# Patient Record
Sex: FEMALE | Race: WHITE | NOT HISPANIC OR LATINO | Employment: FULL TIME | URBAN - METROPOLITAN AREA
[De-identification: names, ages, dates, MRNs, and addresses within clinical notes are randomized per-mention and may not be internally consistent; named-entity substitution may affect disease eponyms.]

---

## 2017-01-17 ENCOUNTER — LAB VISIT (OUTPATIENT)
Dept: LAB | Facility: HOSPITAL | Age: 47
End: 2017-01-17
Attending: INTERNAL MEDICINE
Payer: COMMERCIAL

## 2017-01-17 DIAGNOSIS — E78.5 HYPERLIPIDEMIA LDL GOAL <100: ICD-10-CM

## 2017-01-17 LAB
ALBUMIN SERPL BCP-MCNC: 4 G/DL
ALP SERPL-CCNC: 102 U/L
ALT SERPL W/O P-5'-P-CCNC: 27 U/L
ANION GAP SERPL CALC-SCNC: 10 MMOL/L
AST SERPL-CCNC: 28 U/L
BILIRUB SERPL-MCNC: 0.4 MG/DL
BUN SERPL-MCNC: 11 MG/DL
CALCIUM SERPL-MCNC: 9.3 MG/DL
CHLORIDE SERPL-SCNC: 104 MMOL/L
CHOLEST/HDLC SERPL: 4.1 {RATIO}
CO2 SERPL-SCNC: 23 MMOL/L
CREAT SERPL-MCNC: 0.7 MG/DL
EST. GFR  (AFRICAN AMERICAN): >60 ML/MIN/1.73 M^2
EST. GFR  (NON AFRICAN AMERICAN): >60 ML/MIN/1.73 M^2
GLUCOSE SERPL-MCNC: 87 MG/DL
HDL/CHOLESTEROL RATIO: 24.4 %
HDLC SERPL-MCNC: 176 MG/DL
HDLC SERPL-MCNC: 43 MG/DL
LDLC SERPL CALC-MCNC: 102.2 MG/DL
NONHDLC SERPL-MCNC: 133 MG/DL
POTASSIUM SERPL-SCNC: 4.9 MMOL/L
PROT SERPL-MCNC: 7.7 G/DL
SODIUM SERPL-SCNC: 137 MMOL/L
TRIGL SERPL-MCNC: 154 MG/DL

## 2017-01-17 PROCEDURE — 80061 LIPID PANEL: CPT

## 2017-01-17 PROCEDURE — 80053 COMPREHEN METABOLIC PANEL: CPT

## 2017-01-17 PROCEDURE — 36415 COLL VENOUS BLD VENIPUNCTURE: CPT | Mod: PO

## 2017-01-24 ENCOUNTER — TELEPHONE (OUTPATIENT)
Dept: CARDIOLOGY | Facility: CLINIC | Age: 47
End: 2017-01-24

## 2017-01-24 NOTE — TELEPHONE ENCOUNTER
----- Message from Phi Quintero MD sent at 1/18/2017  7:54 AM CST -----  Please call pt, lab test showed stable cholesterol level, cont current meds.

## 2017-01-27 ENCOUNTER — OFFICE VISIT (OUTPATIENT)
Dept: CARDIOLOGY | Facility: CLINIC | Age: 47
End: 2017-01-27
Payer: COMMERCIAL

## 2017-01-27 VITALS
HEIGHT: 64 IN | WEIGHT: 151.25 LBS | BODY MASS INDEX: 25.82 KG/M2 | HEART RATE: 88 BPM | DIASTOLIC BLOOD PRESSURE: 86 MMHG | SYSTOLIC BLOOD PRESSURE: 126 MMHG

## 2017-01-27 DIAGNOSIS — E78.5 HYPERLIPIDEMIA LDL GOAL <100: Primary | ICD-10-CM

## 2017-01-27 DIAGNOSIS — Z82.49 FAMILY HISTORY OF ISCHEMIC HEART DISEASE: ICD-10-CM

## 2017-01-27 PROCEDURE — 1159F MED LIST DOCD IN RCRD: CPT | Mod: S$GLB,,, | Performed by: INTERNAL MEDICINE

## 2017-01-27 PROCEDURE — 99999 PR PBB SHADOW E&M-EST. PATIENT-LVL III: CPT | Mod: PBBFAC,,, | Performed by: INTERNAL MEDICINE

## 2017-01-27 PROCEDURE — 99213 OFFICE O/P EST LOW 20 MIN: CPT | Mod: S$GLB,,, | Performed by: INTERNAL MEDICINE

## 2017-01-27 NOTE — MR AVS SNAPSHOT
[facility] - Cardiology  [address] [facility] [address] [place] [address]  Phone: [phone]  Fax: [phone]                  Shane DOUGLAS Mike   2017 1:00 PM   Office Visit    Description:  Female : 1970   Provider:  Phi Quintero MD   Department:  [facility] - Cardiology           Reason for Visit     Hyperlipidemia           Diagnoses this Visit        Comments    Hyperlipidemia LDL goal <100    -  Primary     Family history of ischemic heart disease                To Do List           Goals (5 Years of Data)     None      Follow-Up and Disposition     Return if symptoms worsen or fail to improve.      [facility]     [facility] Nurse Care Line -  Assistance  Registered nurses in the [facility] On Call Center provide clinical advisement, health education, appointment booking, and other advisory services.  Call for this free service at [phone].             Medications                Verify that the below list of medications is an accurate representation of the medications you are currently taking.  If none reported, the list may be blank. If incorrect, please contact your healthcare provider. Carry this list with you in case of emergency.           Current Medications     aspirin (ECOTRIN) 81 MG EC tablet Take 1 tablet (81 mg total) by mouth once daily.    fluticasone (FLONASE) 50 mcg/actuation nasal spray 1 spray by Each Nare route Daily.    levocetirizine (XYZAL) 5 MG tablet Take 1 tablet (5 mg total) by mouth every evening.    levothyroxine (SYNTHROID) 25 MCG tablet Take 25 mcg by mouth once daily.    multivitamin (ONE DAILY MULTIVITAMIN) per tablet Take 1 tablet by mouth once daily.    pravastatin (PRAVACHOL) 20 MG tablet Take 1 tablet (20 mg total) by mouth every evening.    vitamin D 1000 units Tab Take 185 mg by mouth once daily.           Clinical Reference Information           Vital Signs - Last Recorded  Most recent update: 2017  1:12 PM by Joseph Rivers LPN    BP Pulse Ht Wt  "BMI    126/86 88 5' 4" (1.626 m) 68.6 kg (151 lb 3.8 oz) 25.96 kg/m2      Blood Pressure          Most Recent Value    Right Arm BP - Sitting  126/86    Left Arm BP - Sitting  124/96    BP  126/86      Allergies as of 1/27/2017     No Known Allergies      Immunizations Administered on Date of Encounter - 1/27/2017     None      [facility] Sign-Up     Activating your [facility] account is as easy as 1-2-3!     1) Visit [email or web address], select Sign Up Now, enter this activation code and your date of birth, then select Next.  [email or web address]  Expires: 3/13/2017  1:15 PM      2) Create a username and password to use when you visit [facility] in the future and select a security question in case you lose your password and select Next.    3) Enter your e-mail address and click Sign Up!    Additional Information  If you have questions, please e-mail [email or web address] or call [phone] to talk to our [facility] staff. Remember, [facility] is NOT to be used for urgent needs. For medical emergencies, dial 911.         Smoking Cessation     If you would like to quit smoking:   You may be eligible for free services if you are a [place] resident and started smoking cigarettes before September 1, 1988.  Call the [employer]oking [employer]essation [employer] (SCT) toll free at (836) 699-1766 or (392) 338-7798.   Call 9-800-QUIT-NOW if you do not meet the above criteria.            "

## 2017-01-27 NOTE — PROGRESS NOTES
Subjective:   Patient ID:  Shane Mckeon is a 46 y.o. female who presents for follow-up of follow up.     HPI  Saw  in 11/2014 for atypical chest pain. Had negative treadmill stress EKG test with good ET. 2D echo normal LVEF.    Her parents both had MI at age 55-60. Cardiac calcium score 0 in 06/2016. Started pravastatin for HLP control.   Came in today for 6 months follow up.   Patient feels OK, no chest pain, no sob, no dizziness, no syncope, no CNS symptoms.  Patient has fairly good exercise tolerance.  Patient is compliant with medications.      Past Medical History   Diagnosis Date    Anxiety     Chest pain syndrome 11/5/2014    Ovarian cyst        Past Surgical History   Procedure Laterality Date    Endometrial ablation         Social History   Substance Use Topics    Smoking status: Never Smoker    Smokeless tobacco: Not on file    Alcohol use Yes       Family History   Problem Relation Age of Onset    Heart failure Mother     Hypertension Mother     Hyperlipidemia Mother     Diabetes Mother     Heart attack Mother 55     MI    Heart failure Father     Hypertension Father     Hyperlipidemia Father     Heart attack Father 60     MI       Patient's Medications   New Prescriptions    No medications on file   Previous Medications    ASPIRIN (ECOTRIN) 81 MG EC TABLET    Take 1 tablet (81 mg total) by mouth once daily.    FLUTICASONE (FLONASE) 50 MCG/ACTUATION NASAL SPRAY    1 spray by Each Nare route Daily.    LEVOCETIRIZINE (XYZAL) 5 MG TABLET    Take 1 tablet (5 mg total) by mouth every evening.    LEVOTHYROXINE (SYNTHROID) 25 MCG TABLET    Take 25 mcg by mouth once daily.    MULTIVITAMIN (ONE DAILY MULTIVITAMIN) PER TABLET    Take 1 tablet by mouth once daily.    PRAVASTATIN (PRAVACHOL) 20 MG TABLET    Take 1 tablet (20 mg total) by mouth every evening.    VITAMIN D 1000 UNITS TAB    Take 185 mg by mouth once daily.   Modified Medications    No medications on file   Discontinued  Medications    No medications on file       Review of Systems   Constitution: Negative.   HENT: Negative.    Eyes: Negative.    Cardiovascular: Negative.  Negative for chest pain.   Respiratory: Negative.    Endocrine: Negative.    Hematologic/Lymphatic: Negative.    Skin: Negative.    Musculoskeletal: Positive for back pain.   Gastrointestinal: Negative.    Genitourinary: Negative.    Neurological: Negative.    Psychiatric/Behavioral: Negative.    Allergic/Immunologic: Negative.        Objective:   Physical Exam   Constitutional: She is oriented to person, place, and time. She appears well-developed and well-nourished.   HENT:   Head: Normocephalic and atraumatic.   Eyes: Conjunctivae are normal. Pupils are equal, round, and reactive to light.   Neck: Normal range of motion. Neck supple.   Cardiovascular: Normal rate and regular rhythm.    Pulmonary/Chest: Effort normal and breath sounds normal.   Abdominal: Soft. Bowel sounds are normal.   Musculoskeletal: Normal range of motion.   Neurological: She is alert and oriented to person, place, and time.   Skin: Skin is warm and dry.   Psychiatric: She has a normal mood and affect.       Lab Results   Component Value Date     01/17/2017    K 4.9 01/17/2017     01/17/2017    CO2 23 01/17/2017    BUN 11 01/17/2017    CREATININE 0.7 01/17/2017    GLU 87 01/17/2017    AST 28 01/17/2017    ALT 27 01/17/2017    ALBUMIN 4.0 01/17/2017    PROT 7.7 01/17/2017    BILITOT 0.4 01/17/2017    WBC 5.69 02/10/2014    HGB 13.0 02/10/2014    HCT 39.6 02/10/2014    MCV 98 02/10/2014     02/10/2014    TSH 1.646 02/10/2014    CHOL 176 01/17/2017    HDL 43 01/17/2017    LDLCALC 102.2 01/17/2017    TRIG 154 (H) 01/17/2017     Assessment:        ICD-10-CM ICD-9-CM   1. Hyperlipidemia LDL goal <100 E78.5 272.4   2. Family history of ischemic heart disease Z82.49 V17.3       Plan:     - good chol control with pravastatin.   - cont current meds. Annual lab, follow up PCP.   RTC  prn.

## 2017-03-03 ENCOUNTER — OFFICE VISIT (OUTPATIENT)
Dept: URGENT CARE | Facility: CLINIC | Age: 47
End: 2017-03-03
Payer: COMMERCIAL

## 2017-03-03 VITALS
OXYGEN SATURATION: 100 % | TEMPERATURE: 98 F | HEART RATE: 99 BPM | DIASTOLIC BLOOD PRESSURE: 80 MMHG | SYSTOLIC BLOOD PRESSURE: 120 MMHG | HEIGHT: 65 IN | WEIGHT: 154.75 LBS | BODY MASS INDEX: 25.78 KG/M2

## 2017-03-03 DIAGNOSIS — J01.00 ACUTE NON-RECURRENT MAXILLARY SINUSITIS: Primary | ICD-10-CM

## 2017-03-03 PROCEDURE — 99214 OFFICE O/P EST MOD 30 MIN: CPT | Mod: S$GLB,,, | Performed by: PHYSICIAN ASSISTANT

## 2017-03-03 PROCEDURE — 1160F RVW MEDS BY RX/DR IN RCRD: CPT | Mod: S$GLB,,, | Performed by: PHYSICIAN ASSISTANT

## 2017-03-03 PROCEDURE — 99999 PR PBB SHADOW E&M-EST. PATIENT-LVL III: CPT | Mod: PBBFAC,,, | Performed by: PHYSICIAN ASSISTANT

## 2017-03-03 RX ORDER — BENZONATATE 100 MG/1
200 CAPSULE ORAL 3 TIMES DAILY PRN
Qty: 60 CAPSULE | Refills: 0 | Status: SHIPPED | OUTPATIENT
Start: 2017-03-03 | End: 2017-03-13

## 2017-03-03 RX ORDER — AMOXICILLIN AND CLAVULANATE POTASSIUM 875; 125 MG/1; MG/1
1 TABLET, FILM COATED ORAL 2 TIMES DAILY
Qty: 14 TABLET | Refills: 0 | Status: SHIPPED | OUTPATIENT
Start: 2017-03-03 | End: 2017-03-10

## 2017-03-03 NOTE — MR AVS SNAPSHOT
[facility]  [address] [address]  Phone: [phone]  Fax: [phone]                  Shane Mckeon   3/3/2017 8:50 AM   Office Visit    Description:  Female : 1970   Provider:  Hemalatha Montemayor PA-C   Department:  [facility]           Reason for Visit     Sinus Problem           Diagnoses this Visit        Comments    Acute non-recurrent maxillary sinusitis    -  Primary            To Do List           Goals (5 Years of Data)     None      Follow-Up and Disposition     Return if symptoms worsen or fail to improve.       These Medications        Disp Refills Start End    amoxicillin-clavulanate 875-125mg (AUGMENTIN) 875-125 mg per tablet 14 tablet 0 3/3/2017 3/10/2017    Take 1 tablet by mouth 2 (two) times daily. - Oral    [facility] [address] Ph #: [phone]       benzonatate (TESSALON) 100 MG capsule 60 capsule 0 3/3/2017 3/13/2017    Take 2 capsules (200 mg total) by mouth 3 (three) times daily as needed for Cough. - Oral    Pharmacy: [facility] [address] Ph #: [phone]         [facility] On [facility]     [facility] On Call Nurse Care Line - 24/7 Assistance  Registered nurses in the [facility] provide clinical advisement, health education, appointment booking, and other advisory services.  Call for this free service at [phone].             Medications           START taking these NEW medications        Refills    amoxicillin-clavulanate 875-125mg (AUGMENTIN) 875-125 mg per tablet 0    Sig: Take 1 tablet by mouth 2 (two) times daily.    Class: Normal    Route: Oral    benzonatate (TESSALON) 100 MG capsule 0    Sig: Take 2 capsules (200 mg total) by mouth 3 (three) times daily as needed for Cough.    Class: Normal    Route: Oral           Verify that the below list of  "medications is an accurate representation of the medications you are currently taking.  If none reported, the list may be blank. If incorrect, please contact your healthcare provider. Carry this list with you in case of emergency.           Current Medications     amoxicillin-clavulanate 875-125mg (AUGMENTIN) 875-125 mg per tablet Take 1 tablet by mouth 2 (two) times daily.    aspirin (ECOTRIN) 81 MG EC tablet Take 1 tablet (81 mg total) by mouth once daily.    benzonatate (TESSALON) 100 MG capsule Take 2 capsules (200 mg total) by mouth 3 (three) times daily as needed for Cough.    fluticasone (FLONASE) 50 mcg/actuation nasal spray 1 spray by Each Nare route Daily.    levocetirizine (XYZAL) 5 MG tablet Take 1 tablet (5 mg total) by mouth every evening.    levothyroxine (SYNTHROID) 25 MCG tablet Take 25 mcg by mouth once daily.    multivitamin (ONE DAILY MULTIVITAMIN) per tablet Take 1 tablet by mouth once daily.    pravastatin (PRAVACHOL) 20 MG tablet Take 1 tablet (20 mg total) by mouth every evening.    vitamin D 1000 units Tab Take 185 mg by mouth once daily.           Clinical Reference Information           Your Vitals Were     BP Pulse Temp Height    120/80 (BP Location: Left arm, Patient Position: Sitting, BP Method: Manual) 99 97.8 °F (36.6 °C) (Tympanic) 5' 5" (1.651 m)    Weight SpO2 BMI    70.2 kg (154 lb 12.2 oz) 100% 25.75 kg/m2      Blood Pressure          Most Recent Value    BP  120/80      Allergies as of 3/3/2017     No Known Allergies      Immunizations Administered on Date of Encounter - 3/3/2017     None      [facility] Sign-Up     Activating your [facility] account is as easy as 1-2-3!     1) Visit [email or web address], select Sign Up Now, enter this activation code and your date of birth, then select Next.  [email or web address]  Expires: 3/13/2017  1:15 PM      2) Create a username and password to use when you visit [facility] in the future and select a security question in case you lose your password " and select Next.    3) Enter your e-mail address and click Sign Up!    Additional Information  If you have questions, please e-mail [email or web address] or call [phone] to talk to our [facility] staff. Remember, [facility] is NOT to be used for urgent needs. For medical emergencies, dial 911.         Instructions      Sinusitis (Antibiotic Treatment)    The sinuses are air-filled spaces within the bones of the face. They connect to the inside of the nose. Sinusitis is an inflammation of the tissue lining the sinus cavity. Sinus inflammation can occur during a cold. It can also be due to allergies to pollens and other particles in the air. Sinusitis can cause symptoms of sinus congestion and fullness. A sinus infection causes fever, headache and facial pain. There is often green or yellow drainage from the nose or into the back of the throat (post-nasal drip). You have been given antibiotics to treat this condition.  Home care:  · Take the full course of antibiotics as instructed. Do not stop taking them, even if you feel better.  · Drink plenty of water, hot tea, and other liquids. This may help thin mucus. It also may promote sinus drainage.  · Heat may help soothe painful areas of the face. Use a towel soaked in hot water. Or,  the shower and direct the hot spray onto your face. Using a vaporizer along with a menthol rub at night may also help.   · An expectorant containing guaifenesin may help thin the mucus and promote drainage from the sinuses.  · Over-the-counter decongestants may be used unless a similar medicine was prescribed. Nasal sprays work the fastest. Use one that contains phenylephrine or oxymetazoline. First blow the nose gently. Then use the spray. Do not use these medicines more often than directed on the label or symptoms may get worse. You may also use tablets containing pseudoephedrine. Avoid products that combine ingredients, because side effects may be increased. Read labels. You  can also ask the pharmacist for help. (NOTE: Persons with high blood pressure should not use decongestants. They can raise blood pressure.)  · Over-the-counter antihistamines may help if allergies contributed to your sinusitis.    · Do not use nasal rinses or irrigation during an acute sinus infection, unless told to by your health care provider. Rinsing may spread the infection to other sinuses.  · Use acetaminophen or ibuprofen to control pain, unless another pain medicine was prescribed. (If you have chronic liver or kidney disease or ever had a stomach ulcer, talk with your doctor before using these medicines. Aspirin should never be used in anyone under 18 years of age who is ill with a fever. It may cause severe liver damage.)  · Don't smoke. This can worsen symptoms.  Follow-up care  Follow up with your healthcare provider or our staff if you are not improving within the next week.  When to seek medical advice  Call your healthcare provider if any of these occur:  · Facial pain or headache becoming more severe  · Stiff neck  · Unusual drowsiness or confusion  · Swelling of the forehead or eyelids  · Vision problems, including blurred or double vision  · Fever of 100.4ºF (38ºC) or higher, or as directed by your healthcare provider  · Seizure  · Breathing problems  · Symptoms not resolving within 10 days  Date Last Reviewed: 4/13/2015  © 4649-2255 [employer]. [address] [place] [address], [place] [address]. All rights reserved. This information is not intended as a substitute for professional medical care. Always follow your healthcare professional's instructions.    Mucinex DM for sinus drainage  Take all antibiotics even if you feel better before completing the entire regimen.   -Use normal saline nasal spray during the day every 3 hours for sinus irrigation and congestion.    -Avoid exposure to cigarette smoke.    -Practice good handwashing.  -Use warm compresses to sinuses for relief several times  a day  -Increase fluid intake to at least 64 ounces of water per day to keep secretions thin and loose  -Use a humidifier in home to help relieve congestion or steam inhalation three times a day for 20-30 minutes.  -Sleep with head of bed elevated   -Take tylenol or ibuprofen as needed for sinus headache.    -Use warm salt water gargles for throat discomfort.  -Avoid caffeine and alcohol    Follow up with PCP in 1 week if no improvement or sooner if worsening.    Go to ER if you develop fever of 103 or higher, chest pain, shortness of breath, upper back pain, stiff neck or severe headache.           Language Assistance Services     ATTENTION: Language assistance services are available, free of charge. Please call [phone].      ATENCIÓN: Si juan m fleming, tiene a campbell disposición servicios gratuitos de asistencia lingüística. Llame al [phone].     KATARINA Ý: N?u b?n nói Ti?ng Vi?t, có các d?ch v? h? tr? ngôn ng? mi?n phí dành cho b?n. G?i s? [phone].         [facility] complies with applicable Federal civil rights laws and does not discriminate on the basis of race, color, national origin, age, disability, or sex.

## 2017-03-03 NOTE — PROGRESS NOTES
"Subjective:       Patient ID: Shane Mckeon is a 47 y.o. female.    Chief Complaint: Sinus Problem    Sinus Problem   This is a new problem. The current episode started 1 to 4 weeks ago (has had upper respiratory symptoms for the past 3 weeks, worsening sinus pain and congestion for last 3 days). There has been no fever. The pain is moderate. Associated symptoms include congestion, coughing, headaches and sinus pressure. Pertinent negatives include no chills, ear pain, shortness of breath, sneezing, sore throat or swollen glands. Treatments tried: daquil, nyquil, flonase. The treatment provided mild relief.     Review of Systems   Constitutional: Negative for chills, fatigue, fever and unexpected weight change.   HENT: Positive for congestion, rhinorrhea and sinus pressure. Negative for ear discharge, ear pain, postnasal drip, sneezing and sore throat.    Respiratory: Positive for cough. Negative for shortness of breath and wheezing.    Cardiovascular: Negative for chest pain and leg swelling.   Gastrointestinal: Negative for abdominal pain, nausea and vomiting.   Neurological: Positive for headaches.       Objective:      /80 (BP Location: Left arm, Patient Position: Sitting, BP Method: Manual)  Pulse 99  Temp 97.8 °F (36.6 °C) (Tympanic)   Ht 5' 5" (1.651 m)  Wt 70.2 kg (154 lb 12.2 oz)  SpO2 100%  BMI 25.75 kg/m2  Physical Exam   Constitutional: She is oriented to person, place, and time. She appears well-developed and well-nourished. No distress.   HENT:   Head: Normocephalic and atraumatic.   Right Ear: Tympanic membrane, external ear and ear canal normal.   Left Ear: Tympanic membrane, external ear and ear canal normal.   Nose: Right sinus exhibits maxillary sinus tenderness. Right sinus exhibits no frontal sinus tenderness. Left sinus exhibits maxillary sinus tenderness. Left sinus exhibits no frontal sinus tenderness.   Mouth/Throat: Oropharynx is clear and moist. No oropharyngeal exudate or " posterior oropharyngeal erythema.   Eyes: Conjunctivae and EOM are normal. Pupils are equal, round, and reactive to light. Right eye exhibits no discharge. Left eye exhibits no discharge. No scleral icterus.   Neck: Normal range of motion. Neck supple.   Cardiovascular: Normal rate, regular rhythm, normal heart sounds and intact distal pulses.  Exam reveals no gallop and no friction rub.    No murmur heard.  Pulmonary/Chest: Effort normal and breath sounds normal. No stridor. No respiratory distress. She has no wheezes. She has no rales. She exhibits no tenderness.   Lymphadenopathy:     She has no cervical adenopathy.   Neurological: She is alert and oriented to person, place, and time. Coordination normal.   Skin: Skin is warm and dry. No rash noted. She is not diaphoretic. No erythema. No pallor.   Nursing note and vitals reviewed.      Assessment:       1. Acute non-recurrent maxillary sinusitis        Plan:       Acute non-recurrent maxillary sinusitis  -     amoxicillin-clavulanate 875-125mg (AUGMENTIN) 875-125 mg per tablet; Take 1 tablet by mouth 2 (two) times daily.  Dispense: 14 tablet; Refill: 0  -     benzonatate (TESSALON) 100 MG capsule; Take 2 capsules (200 mg total) by mouth 3 (three) times daily as needed for Cough.  Dispense: 60 capsule; Refill: 0      Mucinex DM for sinus drainage  Take all antibiotics even if you feel better before completing the entire regimen.   -Use normal saline nasal spray during the day every 3 hours for sinus irrigation and congestion.    -Avoid exposure to cigarette smoke.    -Practice good handwashing.  -Use warm compresses to sinuses for relief several times a day  -Increase fluid intake to at least 64 ounces of water per day to keep secretions thin and loose  -Use a humidifier in home to help relieve congestion or steam inhalation three times a day for 20-30 minutes.  -Sleep with head of bed elevated   -Take tylenol or ibuprofen as needed for sinus headache.    -Use  warm salt water gargles for throat discomfort.  -Avoid caffeine and alcohol    Follow up with PCP in 1 week if no improvement or sooner if worsening.    Go to ER if you develop fever of 103 or higher, chest pain, shortness of breath, upper back pain, stiff neck or severe headache.        Heather Trant PA-C [facility]

## 2017-03-03 NOTE — PATIENT INSTRUCTIONS
Sinusitis (Antibiotic Treatment)    The sinuses are air-filled spaces within the bones of the face. They connect to the inside of the nose. Sinusitis is an inflammation of the tissue lining the sinus cavity. Sinus inflammation can occur during a cold. It can also be due to allergies to pollens and other particles in the air. Sinusitis can cause symptoms of sinus congestion and fullness. A sinus infection causes fever, headache and facial pain. There is often green or yellow drainage from the nose or into the back of the throat (post-nasal drip). You have been given antibiotics to treat this condition.  Home care:  · Take the full course of antibiotics as instructed. Do not stop taking them, even if you feel better.  · Drink plenty of water, hot tea, and other liquids. This may help thin mucus. It also may promote sinus drainage.  · Heat may help soothe painful areas of the face. Use a towel soaked in hot water. Or,  the shower and direct the hot spray onto your face. Using a vaporizer along with a menthol rub at night may also help.   · An expectorant containing guaifenesin may help thin the mucus and promote drainage from the sinuses.  · Over-the-counter decongestants may be used unless a similar medicine was prescribed. Nasal sprays work the fastest. Use one that contains phenylephrine or oxymetazoline. First blow the nose gently. Then use the spray. Do not use these medicines more often than directed on the label or symptoms may get worse. You may also use tablets containing pseudoephedrine. Avoid products that combine ingredients, because side effects may be increased. Read labels. You can also ask the pharmacist for help. (NOTE: Persons with high blood pressure should not use decongestants. They can raise blood pressure.)  · Over-the-counter antihistamines may help if allergies contributed to your sinusitis.    · Do not use nasal rinses or irrigation during an acute sinus infection, unless told to by  your health care provider. Rinsing may spread the infection to other sinuses.  · Use acetaminophen or ibuprofen to control pain, unless another pain medicine was prescribed. (If you have chronic liver or kidney disease or ever had a stomach ulcer, talk with your doctor before using these medicines. Aspirin should never be used in anyone under 18 years of age who is ill with a fever. It may cause severe liver damage.)  · Don't smoke. This can worsen symptoms.  Follow-up care  Follow up with your healthcare provider or our staff if you are not improving within the next week.  When to seek medical advice  Call your healthcare provider if any of these occur:  · Facial pain or headache becoming more severe  · Stiff neck  · Unusual drowsiness or confusion  · Swelling of the forehead or eyelids  · Vision problems, including blurred or double vision  · Fever of 100.4ºF (38ºC) or higher, or as directed by your healthcare provider  · Seizure  · Breathing problems  · Symptoms not resolving within 10 days  Date Last Reviewed: 4/13/2015  © 9621-8800 [employer]. [address] [place] [address]. All rights reserved. This information is not intended as a substitute for professional medical care. Always follow your healthcare professional's instructions.    Mucinex DM for sinus drainage  Take all antibiotics even if you feel better before completing the entire regimen.   -Use normal saline nasal spray during the day every 3 hours for sinus irrigation and congestion.    -Avoid exposure to cigarette smoke.    -Practice good handwashing.  -Use warm compresses to sinuses for relief several times a day  -Increase fluid intake to at least 64 ounces of water per day to keep secretions thin and loose  -Use a humidifier in home to help relieve congestion or steam inhalation three times a day for 20-30 minutes.  -Sleep with head of bed elevated   -Take tylenol or ibuprofen as needed for sinus headache.    -Use warm  salt water gargles for throat discomfort.  -Avoid caffeine and alcohol    Follow up with PCP in 1 week if no improvement or sooner if worsening.    Go to ER if you develop fever of 103 or higher, chest pain, shortness of breath, upper back pain, stiff neck or severe headache.

## 2017-03-08 ENCOUNTER — OFFICE VISIT (OUTPATIENT)
Dept: INTERNAL MEDICINE | Facility: CLINIC | Age: 47
End: 2017-03-08
Payer: COMMERCIAL

## 2017-03-08 ENCOUNTER — HOSPITAL ENCOUNTER (OUTPATIENT)
Dept: RADIOLOGY | Facility: HOSPITAL | Age: 47
Discharge: HOME | End: 2017-03-08
Attending: FAMILY MEDICINE
Payer: COMMERCIAL

## 2017-03-08 VITALS
SYSTOLIC BLOOD PRESSURE: 120 MMHG | HEIGHT: 64 IN | DIASTOLIC BLOOD PRESSURE: 80 MMHG | TEMPERATURE: 98 F | BODY MASS INDEX: 26.64 KG/M2 | HEART RATE: 84 BPM | WEIGHT: 156.06 LBS

## 2017-03-08 DIAGNOSIS — M23.91 INTERNAL DERANGEMENT OF KNEE, RIGHT: ICD-10-CM

## 2017-03-08 DIAGNOSIS — M25.561 POSTERIOR RIGHT KNEE PAIN: Primary | ICD-10-CM

## 2017-03-08 DIAGNOSIS — M25.561 POSTERIOR RIGHT KNEE PAIN: ICD-10-CM

## 2017-03-08 PROCEDURE — 1160F RVW MEDS BY RX/DR IN RCRD: CPT | Mod: S$GLB,,, | Performed by: FAMILY MEDICINE

## 2017-03-08 PROCEDURE — 73560 X-RAY EXAM OF KNEE 1 OR 2: CPT | Mod: TC,59,PO,LT

## 2017-03-08 PROCEDURE — 73562 X-RAY EXAM OF KNEE 3: CPT | Mod: 26,RT,, | Performed by: RADIOLOGY

## 2017-03-08 PROCEDURE — 99999 PR PBB SHADOW E&M-EST. PATIENT-LVL III: CPT | Mod: PBBFAC,,, | Performed by: FAMILY MEDICINE

## 2017-03-08 PROCEDURE — 73560 X-RAY EXAM OF KNEE 1 OR 2: CPT | Mod: 26,59,LT, | Performed by: RADIOLOGY

## 2017-03-08 PROCEDURE — 99214 OFFICE O/P EST MOD 30 MIN: CPT | Mod: S$GLB,,, | Performed by: FAMILY MEDICINE

## 2017-03-08 RX ORDER — NAPROXEN 500 MG/1
500 TABLET ORAL 2 TIMES DAILY WITH MEALS
Qty: 20 TABLET | Refills: 0 | Status: SHIPPED | OUTPATIENT
Start: 2017-03-08 | End: 2017-03-24 | Stop reason: ALTCHOICE

## 2017-03-08 NOTE — PROGRESS NOTES
There is some minimal joint space narrowing involving the medial compartment of both knees. No joint effusion.  No acute fracture or dislocation. Proceed with PT as discussed. Let me know if not improving with use of brace, nsaids and PT after 2 weeks.

## 2017-03-08 NOTE — PROGRESS NOTES
Subjective:      Patient ID: Shane Mckeon is a 47 y.o. female.    Chief Complaint: Knee Pain (r knee comes and goes especially going down steps)    HPI Comments: Pt is here for right knee pain for a few weeks. Worse with going down steps.  She reports that when she steps to turn she feels like it's going to give way.  She does have some pain on the outer backside of her knee.  Going down stairs seems to be worse.  She tried some anti-inflammatories but no change.  She does work as a catering on the weekends.  She is not currently wearing a brace.  There's been no direct trauma.    Knee Pain    The incident occurred 3 to 5 days ago. There was no injury mechanism. The pain is present in the right knee. The quality of the pain is described as aching. The pain is at a severity of 0/10. The patient is experiencing no pain. The pain has been intermittent since onset. Associated symptoms include a loss of motion. Pertinent negatives include no inability to bear weight, loss of sensation, muscle weakness, numbness or tingling. She reports no foreign bodies present. The symptoms are aggravated by movement. She has tried NSAIDs for the symptoms. The treatment provided no relief.       Lab Results   Component Value Date    WBC 5.69 02/10/2014    HGB 13.0 02/10/2014    HCT 39.6 02/10/2014     02/10/2014    CHOL 176 01/17/2017    TRIG 154 (H) 01/17/2017    HDL 43 01/17/2017    ALT 27 01/17/2017    AST 28 01/17/2017     01/17/2017    K 4.9 01/17/2017     01/17/2017    CREATININE 0.7 01/17/2017    BUN 11 01/17/2017    CO2 23 01/17/2017    TSH 1.646 02/10/2014       Review of Systems   Constitutional: Positive for activity change. Negative for appetite change, fatigue and unexpected weight change.   Respiratory: Negative for cough and shortness of breath.    Cardiovascular: Positive for leg swelling. Negative for chest pain and palpitations.   Musculoskeletal: Positive for arthralgias, gait problem and joint  swelling. Negative for myalgias.   Neurological: Positive for weakness. Negative for tingling and numbness.     Objective:     Physical Exam   Constitutional: She appears well-developed and well-nourished.   Cardiovascular: Normal rate and regular rhythm.    Pulmonary/Chest: Effort normal and breath sounds normal.   Musculoskeletal:        Right knee: She exhibits abnormal meniscus. She exhibits normal range of motion, no swelling, no effusion, no deformity and normal patellar mobility. Tenderness found. Lateral joint line tenderness noted. No MCL, no LCL and no patellar tendon tenderness noted.   Vitals reviewed.    Assessment:     1. Posterior right knee pain    2. Internal derangement of knee, right      Plan:   Shane was seen today for knee pain.    Diagnoses and all orders for this visit:    Posterior right knee pain-new, with lateral pain.  Instability with going down steps.  Comments:  for 2-3 weeks, suspect internal derangement with mensicus injury. xray today, nsaids, brace PT. if not improving will set up MRI  Orders:  -     Cancel: X-Ray Knee 3 View Right; Future  -     Ambulatory Referral to Physical/Occupational Therapy  -     naproxen (NAPROSYN) 500 MG tablet; Take 1 tablet (500 mg total) by mouth 2 (two) times daily with meals.    Internal derangement of knee, right-new, suspected.  Advised patient to use anti-inflammatories, given a brace, also will refer to physical therapy.  If not improving in 2-3 weeks we'll set up MRI.  Patient was in agreement with the plan.  -     Cancel: X-Ray Knee 3 View Right; Future  -     Ambulatory Referral to Physical/Occupational Therapy  -     naproxen (NAPROSYN) 500 MG tablet; Take 1 tablet (500 mg total) by mouth 2 (two) times daily with meals.            Return if symptoms worsen or fail to improve.

## 2017-03-08 NOTE — MR AVS SNAPSHOT
[facility]  [address]  [place] [address]  Phone: [phone]  Fax: [phone]                  Shane Mckeon   3/8/2017 1:40 PM   Office Visit    Description:  Female : 1970   Provider:  Nika Zavala MD   [facility]           Reason for Visit     Knee Pain           Diagnoses this Visit        Comments    Posterior right knee pain    -  Primary for 2-3 weeks, suspect internal derangement with mensicus injury. xray today, nsaids, brace PT. if not improving will set up MRI    Internal derangement of knee, right                To Do List           Goals (5 Years of Data)     None      Follow-Up and Disposition     Return if symptoms worsen or fail to improve.       These Medications        Disp Refills Start End    naproxen (NAPROSYN) 500 MG tablet 20 tablet 0 3/8/2017     Take 1 tablet (500 mg total) by mouth 2 (two) times daily with meals. - Oral    Ph[facility], [place]  [address] [address]  #: [phone]         [facility] On Call     [facility] On Call Nurse Care Line -  Assistance  Registered nurses in the [facility] On Call Center provide clinical advisement, health education, appointment booking, and other advisory services.  Call for this free service at [phone].             Medications           START taking these NEW medications        Refills    naproxen (NAPROSYN) 500 MG tablet 0    Sig: Take 1 tablet (500 mg total) by mouth 2 (two) times daily with meals.    Class: Normal    Route: Oral           Verify that the below list of medications is an accurate representation of the medications you are currently taking.  If none reported, the list may be blank. If incorrect, please contact your healthcare provider. Carry this list with you in case of emergency.           Current Medications     amoxicillin-clavulanate 875-125mg (AUGMENTIN) 875-125 mg per  "tablet Take 1 tablet by mouth 2 (two) times daily.    aspirin (ECOTRIN) 81 MG EC tablet Take 1 tablet (81 mg total) by mouth once daily.    benzonatate (TESSALON) 100 MG capsule Take 2 capsules (200 mg total) by mouth 3 (three) times daily as needed for Cough.    fluticasone (FLONASE) 50 mcg/actuation nasal spray 1 spray by Each Nare route Daily.    levocetirizine (XYZAL) 5 MG tablet Take 1 tablet (5 mg total) by mouth every evening.    levothyroxine (SYNTHROID) 25 MCG tablet Take 25 mcg by mouth once daily.    multivitamin (ONE DAILY MULTIVITAMIN) per tablet Take 1 tablet by mouth once daily.    naproxen (NAPROSYN) 500 MG tablet Take 1 tablet (500 mg total) by mouth 2 (two) times daily with meals.    pravastatin (PRAVACHOL) 20 MG tablet Take 1 tablet (20 mg total) by mouth every evening.    vitamin D 1000 units Tab Take 185 mg by mouth once daily.           Clinical Reference Information           Your Vitals Were     BP Pulse Temp Height Weight BMI    120/80 84 98.2 °F (36.8 °C) 5' 4" (1.626 m) 70.8 kg (156 lb 1.4 oz) 26.79 kg/m2      Blood Pressure          Most Recent Value    BP  120/80      Allergies as of 3/8/2017     No Known Allergies      Immunizations Administered on Date of Encounter - 3/8/2017     None      Orders Placed During Today's Visit      Normal Orders This Visit    Ambulatory Referral to Physical/Occupational Therapy     Future Labs/Procedures Expected by Expires    X-Ray Knee 3 View Right  3/8/2017 3/8/2018      [facility] Sign-Up     Activating your [facility] account is as easy as 1-2-3!     1) Visit [email or web address], select Sign Up Now, enter this activation code and your date of birth, then select Next.  [email or web address]  Expires: 3/13/2017  1:15 PM      2) Create a username and password to use when you visit [facility] in the future and select a security question in case you lose your password and select Next.    3) Enter your e-mail address and click Sign Up!    Additional Information  If " you have questions, please e-mail [email or web address] or call [phone] to talk to our [facility] staff. Remember, [facility] is NOT to be used for urgent needs. For medical emergencies, dial 911.         Language Assistance Services     ATTENTION: Language assistance services are available, free of charge. Please call [phone].      ATENCIÓN: Si habla español, tiene a campbell disposición servicios gratuitos de asistencia lingüística. Llame al [phone].     [facility] Ý: N?u b?n nói Ti?ng Vi?t, có các d?ch v? h? tr? ngôn ng? mi?n phí dành cho b?n. G?i s? [phone].         [facility] complies with applicable Federal civil rights laws and does not discriminate on the basis of race, color, national origin, age, disability, or sex.

## 2017-03-09 ENCOUNTER — TELEPHONE (OUTPATIENT)
Dept: INTERNAL MEDICINE | Facility: CLINIC | Age: 47
End: 2017-03-09

## 2017-03-09 DIAGNOSIS — E78.5 HYPERLIPIDEMIA LDL GOAL <100: ICD-10-CM

## 2017-03-09 RX ORDER — PRAVASTATIN SODIUM 20 MG/1
TABLET ORAL
Qty: 30 TABLET | OUTPATIENT
Start: 2017-03-09

## 2017-03-09 NOTE — TELEPHONE ENCOUNTER
----- Message from Nadia Ramos sent at 3/9/2017 12:55 PM CST -----  Patient states that someone called her yesterday and she wants to know if you called with the results of her xray.   Call her at [phone].                                      hernandez

## 2017-03-10 ENCOUNTER — TELEPHONE (OUTPATIENT)
Dept: INTERNAL MEDICINE | Facility: CLINIC | Age: 47
End: 2017-03-10

## 2017-03-10 NOTE — TELEPHONE ENCOUNTER
----- Message from Radha Sherman sent at 3/10/2017  8:43 AM CST -----  States calling to get x-ray results. Please adv/call [phone].//thanks. cw

## 2017-03-12 NOTE — PROGRESS NOTES
"Minimal joint space narrowing is "wear and tear" arthritis of the knees over time. Can take a supplement such as glucosamine and chondroitin to help build back up cartilage in the knee."

## 2017-03-24 ENCOUNTER — OFFICE VISIT (OUTPATIENT)
Dept: INTERNAL MEDICINE | Facility: CLINIC | Age: 47
End: 2017-03-24
Payer: COMMERCIAL

## 2017-03-24 ENCOUNTER — PATIENT MESSAGE (OUTPATIENT)
Dept: INTERNAL MEDICINE | Facility: CLINIC | Age: 47
End: 2017-03-24

## 2017-03-24 ENCOUNTER — HOSPITAL ENCOUNTER (OUTPATIENT)
Dept: RADIOLOGY | Facility: HOSPITAL | Age: 47
Discharge: HOME | End: 2017-03-24
Attending: FAMILY MEDICINE
Payer: COMMERCIAL

## 2017-03-24 VITALS
WEIGHT: 152 LBS | SYSTOLIC BLOOD PRESSURE: 126 MMHG | HEIGHT: 64 IN | BODY MASS INDEX: 25.95 KG/M2 | OXYGEN SATURATION: 99 % | TEMPERATURE: 99 F | DIASTOLIC BLOOD PRESSURE: 84 MMHG | HEART RATE: 88 BPM

## 2017-03-24 DIAGNOSIS — R07.1 CHEST PAIN ON RESPIRATION: ICD-10-CM

## 2017-03-24 DIAGNOSIS — L30.9 DERMATITIS: ICD-10-CM

## 2017-03-24 DIAGNOSIS — M23.90 INTERNAL DERANGEMENT OF KNEE, UNSPECIFIED LATERALITY: Primary | ICD-10-CM

## 2017-03-24 PROCEDURE — 71020 XR CHEST PA AND LATERAL: CPT | Mod: 26,,, | Performed by: RADIOLOGY

## 2017-03-24 PROCEDURE — 71020 XR CHEST PA AND LATERAL: CPT | Mod: TC,PO

## 2017-03-24 PROCEDURE — 99999 PR PBB SHADOW E&M-EST. PATIENT-LVL IV: CPT | Mod: PBBFAC,,, | Performed by: PHYSICIAN ASSISTANT

## 2017-03-24 PROCEDURE — 1160F RVW MEDS BY RX/DR IN RCRD: CPT | Mod: S$GLB,,, | Performed by: PHYSICIAN ASSISTANT

## 2017-03-24 PROCEDURE — 99214 OFFICE O/P EST MOD 30 MIN: CPT | Mod: S$GLB,,, | Performed by: PHYSICIAN ASSISTANT

## 2017-03-24 RX ORDER — ALBUTEROL SULFATE 90 UG/1
2 AEROSOL, METERED RESPIRATORY (INHALATION) EVERY 4 HOURS PRN
Qty: 18 G | Refills: 0 | Status: SHIPPED | OUTPATIENT
Start: 2017-03-24 | End: 2020-08-13

## 2017-03-24 RX ORDER — DESOXIMETASONE 2.5 MG/G
CREAM TOPICAL 2 TIMES DAILY
Qty: 30 G | Refills: 0 | Status: SHIPPED | OUTPATIENT
Start: 2017-03-24 | End: 2017-11-28

## 2017-03-24 NOTE — PROGRESS NOTES
Subjective:       Patient ID: Shane Mckeon is a 47 y.o. female.    Chief Complaint: Follow-up; Rash; and Shortness of Breath    HPI  Patient comes in today for a few issues.   First she has a rash on shin that has been present for a few weeks, it is itchy in nature. Has not helped with otc creams.   She also has some intermittent pain with breathing in the chest. 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  Also has an intermittent dry cough.       Past Medical History:   Diagnosis Date    Anxiety     Chest pain syndrome 11/5/2014    Ovarian cyst        Current Outpatient Prescriptions   Medication Sig Dispense Refill    aspirin (ECOTRIN) 81 MG EC tablet Take 1 tablet (81 mg total) by mouth once daily. (Patient taking differently: Take 81 mg by mouth every other day. ) 30 tablet 0    fluticasone (FLONASE) 50 mcg/actuation nasal spray 1 spray by Each Nare route Daily. 1 Bottle 11    levocetirizine (XYZAL) 5 MG tablet Take 1 tablet (5 mg total) by mouth every evening. 30 tablet 11    levothyroxine (SYNTHROID) 25 MCG tablet Take 25 mcg by mouth once daily.  11    multivitamin (ONE DAILY MULTIVITAMIN) per tablet Take 1 tablet by mouth once daily.      pravastatin (PRAVACHOL) 20 MG tablet Take 1 tablet (20 mg total) by mouth every evening. 30 tablet 6    vitamin D 1000 units Tab Take 185 mg by mouth once daily.      albuterol 90 mcg/actuation inhaler Inhale 2 puffs into the lungs every 4 (four) hours as needed for Shortness of Breath. Rescue 18 g 0    desoximetasone (TOPICORT) 0.25 % cream Apply topically 2 (two) times daily. 30 g 0     No current facility-administered medications for this visit.        Review of Systems   Constitutional: Negative.    HENT: Negative.    Eyes: Negative.    Respiratory: Positive for chest tightness.    Gastrointestinal: Negative.    Endocrine: Negative.   "  Genitourinary: Negative.    Musculoskeletal: Negative.         Knee pain    Skin: Positive for rash.   Allergic/Immunologic: Negative.    Neurological: Negative.    Hematological: Negative.    Psychiatric/Behavioral: Negative.        Objective:   /84  Pulse 88  Temp 98.6 °F (37 °C) (Tympanic)   Ht 5' 4" (1.626 m)  Wt 68.9 kg (152 lb)  SpO2 99%  BMI 26.09 kg/m2     Physical Exam   Constitutional: She is oriented to person, place, and time. She appears well-developed and well-nourished. No distress.   HENT:   Head: Normocephalic and atraumatic.   Right Ear: Hearing, tympanic membrane, external ear and ear canal normal.   Left Ear: Hearing, tympanic membrane, external ear and ear canal normal.   Nose: Nose normal. No sinus tenderness. Right sinus exhibits no maxillary sinus tenderness and no frontal sinus tenderness. Left sinus exhibits no maxillary sinus tenderness and no frontal sinus tenderness.   Mouth/Throat: Uvula is midline, oropharynx is clear and moist and mucous membranes are normal. No oropharyngeal exudate, posterior oropharyngeal edema, posterior oropharyngeal erythema or tonsillar abscesses.   Eyes: Conjunctivae are normal. Pupils are equal, round, and reactive to light.   Neck: Normal range of motion. Neck supple.   Cardiovascular: Normal rate, regular rhythm and normal heart sounds.    Pulmonary/Chest: Effort normal and breath sounds normal. No respiratory distress.   Musculoskeletal:        Legs:  Neurological: She is alert and oriented to person, place, and time.   Skin: Skin is warm.   Psychiatric: She has a normal mood and affect. Her behavior is normal. Judgment and thought content normal.   Vitals reviewed.        Lab Results   Component Value Date    WBC 5.69 02/10/2014    HGB 13.0 02/10/2014    HCT 39.6 02/10/2014     02/10/2014    CHOL 176 01/17/2017    TRIG 154 (H) 01/17/2017    HDL 43 01/17/2017    ALT 27 01/17/2017    AST 28 01/17/2017     01/17/2017    K 4.9 " 01/17/2017     01/17/2017    CREATININE 0.7 01/17/2017    BUN 11 01/17/2017    CO2 23 01/17/2017    TSH 1.646 02/10/2014       Assessment:       1. Internal derangement of knee, unspecified laterality    2. Chest pain on respiration    3. Dermatitis        Plan:   Internal derangement of knee, unspecified laterality  Comments:  Suggest to continue P.T. for a little while longer, laurie MRI in case no improvment   Orders:  -     MRI Lower Extremity Joint WO Cont Right; Future; Expected date: 3/24/17    Chest pain on respiration  Comments:  Likley to do with recent exposure to david environment, start inhaler, avoid david area  Orders:  -     X-Ray Chest PA And Lateral; Future; Expected date: 3/24/17  -     albuterol 90 mcg/actuation inhaler; Inhale 2 puffs into the lungs every 4 (four) hours as needed for Shortness of Breath. Rescue  Dispense: 18 g; Refill: 0  Dermatitis   -     desoximetasone (TOPICORT) 0.25 % cream; Apply topically 2 (two) times daily.  Dispense: 30 g; Refill: 0

## 2017-03-24 NOTE — MR AVS SNAPSHOT
[facility]  [address]  [place] [address]  Phone: [phone]  Fax: [phone]                  Shane Mckeon   3/24/2017 9:40 AM   Office Visit    Description:  Female : 1970   Provider:  SULY Wood   [facility]           Reason for Visit     Follow-up     Rash     Shortness of Breath           Diagnoses this Visit        Comments    Internal derangement of knee, unspecified laterality    -  Primary     Chest pain on respiration                To Do List           Future Appointments        Provider Department Dept Phone    3/31/2017 2:45 PM [facility] [phone]      Goals (5 Years of Data)     None       These Medications        Disp Refills Start End    desoximetasone (TOPICORT) 0.25 % cream 30 g 0 3/24/2017 4/3/2017    Apply topically 2 (two) times daily. - Topical (Top)    Pharmacy: [facility] [place] - [address] Ph #: [phone]       albuterol 90 mcg/actuation inhaler 18 g 0 3/24/2017 3/24/2018    Inhale 2 puffs into the lungs every 4 (four) hours as needed for Shortness of Breath. Rescue - Inhalation    [facility] [place] [facility] [address] Ph #: [phone]         [facility] On Call     [facility] On Call Nurse Care Line -  Assistance  Registered nurses in the [facility] On Call Center provide clinical advisement, health education, appointment booking, and other advisory services.  Call for this free service at [phone].             Medications           START taking these NEW medications        Refills    desoximetasone (TOPICORT) 0.25 % cream 0    Sig: Apply topically 2 (two) times daily.    Class: Normal    Route: Topical (Top)    albuterol 90 mcg/actuation inhaler 0    Sig: Inhale 2 puffs into the lungs every 4 (four) hours as needed for Shortness of Breath.  "Rescue    Class: Normal    Route: Inhalation      STOP taking these medications     naproxen (NAPROSYN) 500 MG tablet Take 1 tablet (500 mg total) by mouth 2 (two) times daily with meals.           Verify that the below list of medications is an accurate representation of the medications you are currently taking.  If none reported, the list may be blank. If incorrect, please contact your healthcare provider. Carry this list with you in case of emergency.           Current Medications     aspirin (ECOTRIN) 81 MG EC tablet Take 1 tablet (81 mg total) by mouth once daily.    fluticasone (FLONASE) 50 mcg/actuation nasal spray 1 spray by Each Nare route Daily.    levocetirizine (XYZAL) 5 MG tablet Take 1 tablet (5 mg total) by mouth every evening.    levothyroxine (SYNTHROID) 25 MCG tablet Take 25 mcg by mouth once daily.    multivitamin (ONE DAILY MULTIVITAMIN) per tablet Take 1 tablet by mouth once daily.    pravastatin (PRAVACHOL) 20 MG tablet Take 1 tablet (20 mg total) by mouth every evening.    vitamin D 1000 units Tab Take 185 mg by mouth once daily.    albuterol 90 mcg/actuation inhaler Inhale 2 puffs into the lungs every 4 (four) hours as needed for Shortness of Breath. Rescue    desoximetasone (TOPICORT) 0.25 % cream Apply topically 2 (two) times daily.           Clinical Reference Information           Your Vitals Were     BP Pulse Temp Height Weight SpO2    126/84 88 98.6 °F (37 °C) (Tympanic) 5' 4" (1.626 m) 68.9 kg (152 lb) 99%    BMI                26.09 kg/m2          Blood Pressure          Most Recent Value    BP  126/84      Allergies as of 3/24/2017     No Known Allergies      Immunizations Administered on Date of Encounter - 3/24/2017     None      Orders Placed During Today's Visit     Future Labs/Procedures Expected by Expires    MRI Lower Extremity Joint WO Cont Right  3/24/2017 3/24/2018    X-Ray Chest PA And Lateral  3/24/2017 3/24/2018      Language Assistance Services     ATTENTION: Language " assistance services are available, free of charge. Please call [phone].      ATENCIÓN: Si habla bernadette, tiene a campbell disposición servicios gratuitos de asistencia lingüística. Llame al [phone].     CHÚ Ý: N?u b?n nói Ti?ng Vi?t, có các d?ch v? h? tr? ngôn ng? mi?n phí dành cho b?n. G?i s? [phone].         [facility] complies with applicable Federal civil rights laws and does not discriminate on the basis of race, color, national origin, age, disability, or sex.

## 2017-03-31 ENCOUNTER — HOSPITAL ENCOUNTER (OUTPATIENT)
Dept: RADIOLOGY | Facility: HOSPITAL | Age: 47
Discharge: HOME | End: 2017-03-31
Attending: FAMILY MEDICINE
Payer: COMMERCIAL

## 2017-03-31 DIAGNOSIS — M23.90 INTERNAL DERANGEMENT OF KNEE, UNSPECIFIED LATERALITY: ICD-10-CM

## 2017-03-31 PROCEDURE — 73721 MRI JNT OF LWR EXTRE W/O DYE: CPT | Mod: 26,RT,, | Performed by: RADIOLOGY

## 2017-03-31 PROCEDURE — 73721 MRI JNT OF LWR EXTRE W/O DYE: CPT | Mod: TC,PO,RT

## 2017-04-05 ENCOUNTER — TELEPHONE (OUTPATIENT)
Dept: INTERNAL MEDICINE | Facility: CLINIC | Age: 47
End: 2017-04-05

## 2017-04-05 DIAGNOSIS — M25.569 KNEE PAIN, UNSPECIFIED CHRONICITY, UNSPECIFIED LATERALITY: Primary | ICD-10-CM

## 2017-04-05 NOTE — TELEPHONE ENCOUNTER
----- Message from Shana Hay LPN sent at 4/3/2017  2:27 PM CDT -----  Called pt and informed her of results, Pt stated understanding.  [facility] would [facility]. She will go through therapy and if it does not get better she will follow up with Cardiology.

## 2017-04-13 ENCOUNTER — PATIENT MESSAGE (OUTPATIENT)
Dept: INTERNAL MEDICINE | Facility: CLINIC | Age: 47
End: 2017-04-13

## 2017-04-13 DIAGNOSIS — M22.41 PATELLA, CHONDROMALACIA, RIGHT: Primary | ICD-10-CM

## 2017-10-02 ENCOUNTER — PATIENT MESSAGE (OUTPATIENT)
Dept: INTERNAL MEDICINE | Facility: CLINIC | Age: 47
End: 2017-10-02

## 2017-10-02 DIAGNOSIS — M25.569 KNEE PAIN, UNSPECIFIED CHRONICITY, UNSPECIFIED LATERALITY: Primary | ICD-10-CM

## 2017-10-02 NOTE — TELEPHONE ENCOUNTER
Pt needs another referral for Dr. Fleming office. The refferal from April is no longer good. Pt has knee pain that got better so she didn't not go to her apt. Now the pain is back and she needs another referral.

## 2017-10-25 ENCOUNTER — OFFICE VISIT (OUTPATIENT)
Dept: INTERNAL MEDICINE | Facility: CLINIC | Age: 47
End: 2017-10-25
Payer: COMMERCIAL

## 2017-10-25 ENCOUNTER — LAB VISIT (OUTPATIENT)
Dept: LAB | Facility: HOSPITAL | Age: 47
End: 2017-10-25
Payer: COMMERCIAL

## 2017-10-25 VITALS
DIASTOLIC BLOOD PRESSURE: 80 MMHG | HEIGHT: 64 IN | TEMPERATURE: 98 F | HEART RATE: 80 BPM | SYSTOLIC BLOOD PRESSURE: 120 MMHG | WEIGHT: 151.69 LBS | BODY MASS INDEX: 25.9 KG/M2

## 2017-10-25 DIAGNOSIS — R07.89 ATYPICAL CHEST PAIN: ICD-10-CM

## 2017-10-25 DIAGNOSIS — Z01.818 PRE-OP EVALUATION: ICD-10-CM

## 2017-10-25 DIAGNOSIS — E78.5 HYPERLIPIDEMIA LDL GOAL <100: ICD-10-CM

## 2017-10-25 DIAGNOSIS — Z01.818 PRE-OP EVALUATION: Primary | ICD-10-CM

## 2017-10-25 LAB
ANION GAP SERPL CALC-SCNC: 7 MMOL/L
BASOPHILS # BLD AUTO: 0.03 K/UL
BASOPHILS NFR BLD: 0.4 %
BUN SERPL-MCNC: 11 MG/DL
CALCIUM SERPL-MCNC: 9.4 MG/DL
CHLORIDE SERPL-SCNC: 104 MMOL/L
CO2 SERPL-SCNC: 27 MMOL/L
CREAT SERPL-MCNC: 0.7 MG/DL
DIFFERENTIAL METHOD: ABNORMAL
EOSINOPHIL # BLD AUTO: 0.1 K/UL
EOSINOPHIL NFR BLD: 0.7 %
ERYTHROCYTE [DISTWIDTH] IN BLOOD BY AUTOMATED COUNT: 11.8 %
EST. GFR  (AFRICAN AMERICAN): >60 ML/MIN/1.73 M^2
EST. GFR  (NON AFRICAN AMERICAN): >60 ML/MIN/1.73 M^2
GLUCOSE SERPL-MCNC: 83 MG/DL
HCT VFR BLD AUTO: 40.4 %
HGB BLD-MCNC: 13 G/DL
IMM GRANULOCYTES # BLD AUTO: 0.02 K/UL
IMM GRANULOCYTES NFR BLD AUTO: 0.3 %
LYMPHOCYTES # BLD AUTO: 1.4 K/UL
LYMPHOCYTES NFR BLD: 20.4 %
MCH RBC QN AUTO: 31.6 PG
MCHC RBC AUTO-ENTMCNC: 32.2 G/DL
MCV RBC AUTO: 98 FL
MONOCYTES # BLD AUTO: 0.5 K/UL
MONOCYTES NFR BLD: 7.4 %
NEUTROPHILS # BLD AUTO: 4.9 K/UL
NEUTROPHILS NFR BLD: 70.8 %
NRBC BLD-RTO: 0 /100 WBC
PLATELET # BLD AUTO: 233 K/UL
PMV BLD AUTO: 11.8 FL
POTASSIUM SERPL-SCNC: 4.1 MMOL/L
RBC # BLD AUTO: 4.12 M/UL
SODIUM SERPL-SCNC: 138 MMOL/L
WBC # BLD AUTO: 6.9 K/UL

## 2017-10-25 PROCEDURE — 93005 ELECTROCARDIOGRAM TRACING: CPT | Mod: S$GLB,,, | Performed by: PHYSICIAN ASSISTANT

## 2017-10-25 PROCEDURE — 93010 ELECTROCARDIOGRAM REPORT: CPT | Mod: S$GLB,,, | Performed by: NUCLEAR MEDICINE

## 2017-10-25 PROCEDURE — 85025 COMPLETE CBC W/AUTO DIFF WBC: CPT

## 2017-10-25 PROCEDURE — 99999 PR PBB SHADOW E&M-EST. PATIENT-LVL III: CPT | Mod: PBBFAC,,, | Performed by: PHYSICIAN ASSISTANT

## 2017-10-25 PROCEDURE — 80048 BASIC METABOLIC PNL TOTAL CA: CPT

## 2017-10-25 PROCEDURE — 99214 OFFICE O/P EST MOD 30 MIN: CPT | Mod: S$GLB,,, | Performed by: PHYSICIAN ASSISTANT

## 2017-10-25 PROCEDURE — 36415 COLL VENOUS BLD VENIPUNCTURE: CPT | Mod: PO

## 2017-10-25 NOTE — PROGRESS NOTES
"Subjective:       Patient ID: Shane Mckeon is a 47 y.o. female.    Chief Complaint: Pre-op Exam    HPI    Past Medical History:   Diagnosis Date    Anxiety     Chest pain syndrome 11/5/2014    Ovarian cyst        Current Outpatient Prescriptions   Medication Sig Dispense Refill    levocetirizine (XYZAL) 5 MG tablet Take 1 tablet (5 mg total) by mouth every evening. 30 tablet 11    albuterol 90 mcg/actuation inhaler Inhale 2 puffs into the lungs every 4 (four) hours as needed for Shortness of Breath. Rescue 18 g 0    aspirin (ECOTRIN) 81 MG EC tablet Take 1 tablet (81 mg total) by mouth once daily. (Patient taking differently: Take 81 mg by mouth every other day. ) 30 tablet 0    desoximetasone (TOPICORT) 0.25 % cream Apply topically 2 (two) times daily. 30 g 0    fluticasone (FLONASE) 50 mcg/actuation nasal spray 1 spray by Each Nare route Daily. 1 Bottle 11    levothyroxine (SYNTHROID) 25 MCG tablet Take 25 mcg by mouth once daily.  11    multivitamin (ONE DAILY MULTIVITAMIN) per tablet Take 1 tablet by mouth once daily.      pravastatin (PRAVACHOL) 20 MG tablet Take 1 tablet (20 mg total) by mouth every evening. 30 tablet 6    vitamin D 1000 units Tab Take 185 mg by mouth once daily.              Review of Systems   Constitutional: Negative.    HENT: Negative.    Eyes: Negative.    Respiratory: Negative.    Cardiovascular: Negative.    Gastrointestinal: Negative.    Endocrine: Negative.    Genitourinary: Negative.    Musculoskeletal: Negative.         Knee right pain/scope    Skin: Negative.    Allergic/Immunologic: Negative.    Neurological: Negative.    Hematological: Negative.    Psychiatric/Behavioral: Negative.        Objective:   /80   Pulse 80   Temp 98.4 °F (36.9 °C) (Tympanic)   Ht 5' 4" (1.626 m)   Wt 68.8 kg (151 lb 10.8 oz)   BMI 26.04 kg/m²      Physical Exam      Lab Results   Component Value Date    WBC 5.69 02/10/2014    HGB 13.0 02/10/2014    HCT 39.6 02/10/2014    PLT " 237 02/10/2014    CHOL 176 01/17/2017    TRIG 154 (H) 01/17/2017    HDL 43 01/17/2017    ALT 27 01/17/2017    AST 28 01/17/2017     01/17/2017    K 4.9 01/17/2017     01/17/2017    CREATININE 0.7 01/17/2017    BUN 11 01/17/2017    CO2 23 01/17/2017    TSH 1.646 02/10/2014       Assessment:       1. Pre-op evaluation        Plan:   Pre-op evaluation  -     CBC auto differential; Future; Expected date: 10/25/2017  -     Basic metabolic panel; Future; Expected date: 10/25/2017  -     EKG 12-lead; Future

## 2017-10-26 NOTE — PROGRESS NOTES
" Subjective:      Shane Mckeon is a 47 y.o. female who presents to the office today for a preoperative consultation at the request of surgeon Dr. Block who plans on performing knee arthroscope on November 2. This consultation is requested for the specific conditions prompting preoperative evaluation (i.e. because of potential affect on operative risk): none. Planned anesthesia: general. The patient has the following known anesthesia issues: none. has had 2 surgeries in the past w/o issues. . Patients bleeding risk: no recent abnormal bleeding and no remote history of abnormal bleeding. Patient does not have objections to receiving blood products if needed.    The following portions of the patient's history were reviewed and updated as appropriate: allergies, current medications, past family history, past medical history, past social history, past surgical history and problem list.    Review of Systems  A comprehensive review of systems was negative.      Objective:      /80   Pulse 80   Temp 98.4 °F (36.9 °C) (Tympanic)   Ht 5' 4" (1.626 m)   Wt 68.8 kg (151 lb 10.8 oz)   BMI 26.04 kg/m²   General appearance: alert, appears stated age and cooperative  Head: Normocephalic, without obvious abnormality, atraumatic  Eyes: conjunctivae/corneas clear. PERRL, EOM's intact. Fundi benign.  Ears: normal TM's and external ear canals both ears  Nose: Nares normal. Septum midline. Mucosa normal. No drainage or sinus tenderness.  Throat: lips, mucosa, and tongue normal; teeth and gums normal  Neck: no adenopathy, no carotid bruit, no JVD, supple, symmetrical, trachea midline and thyroid not enlarged, symmetric, no tenderness/mass/nodules  Lungs: clear to auscultation bilaterally  Heart: regular rate and rhythm, S1, S2 normal, no murmur, click, rub or gallop  Extremities: extremities normal, atraumatic, no cyanosis or edema  Pulses: 2+ and symmetric  Skin: Skin color, texture, turgor normal. No rashes or " lesions  Lymph nodes: Cervical, supraclavicular, and axillary nodes normal.      Cardiographics  ECG: normal sinus rhythm, no blocks or conduction defects, no ischemic changes  Echocardiogram: not done      Lab Review   No visits with results within 2 Month(s) from this visit.   Latest known visit with results is:   Lab Visit on 01/17/2017   Component Date Value    Cholesterol 01/17/2017 176     Triglycerides 01/17/2017 154*    HDL 01/17/2017 43     LDL Cholesterol 01/17/2017 102.2     HDL/Chol Ratio 01/17/2017 24.4     Total Cholesterol/HDL Ra* 01/17/2017 4.1     Non-HDL Cholesterol 01/17/2017 133     Sodium 01/17/2017 137     Potassium 01/17/2017 4.9     Chloride 01/17/2017 104     CO2 01/17/2017 23     Glucose 01/17/2017 87     BUN, Bld 01/17/2017 11     Creatinine 01/17/2017 0.7     Calcium 01/17/2017 9.3     Total Protein 01/17/2017 7.7     Albumin 01/17/2017 4.0     Total Bilirubin 01/17/2017 0.4     Alkaline Phosphatase 01/17/2017 102     AST 01/17/2017 28     ALT 01/17/2017 27     Anion Gap 01/17/2017 10     eGFR if African American 01/17/2017 >60.0     eGFR if non  Amer* 01/17/2017 >60.0          Assessment:        47 y.o. female with planned surgery as above.    Known risk factors for perioperative complications: None    Difficulty with intubation is not anticipated.    Cardiac Risk Estimation: low       Plan:      1. Preoperative workup as follows ECG, hemoglobin, hematocrit, electrolytes, creatinine, glucose.  2. Change in medication regimen before surgery: discontinue NSAIDs (at least) 14 days before surgery.  3. Patient not taking thyroid or cholesterol medication   Suggest she see PCP after surgery to go over HM   4. Other measures: DVT prophylaxis per standard

## 2017-11-01 ENCOUNTER — TELEPHONE (OUTPATIENT)
Dept: INTERNAL MEDICINE | Facility: CLINIC | Age: 47
End: 2017-11-01

## 2017-11-01 NOTE — TELEPHONE ENCOUNTER
----- Message from Hay Marcelo sent at 11/1/2017  1:55 PM CDT -----  Contact: shazia Esposito/ Dr. [facility]  She's calling in regards to the pt, EKG, X-ray, labs and 's henry note to be faxed to: [phone], pt procedure is scheduled Thursday 11/2/17, ph# [phone]

## 2017-11-01 NOTE — TELEPHONE ENCOUNTER
----- Message from Yoly Walton sent at 11/1/2017  2:04 PM CDT -----  Contact: Patient  Patient states that Dr Alaniz's office needs her clearance for faxed to them, [phone]. Please call patient back if needed at [phone]. Thank you

## 2017-11-15 ENCOUNTER — PATIENT OUTREACH (OUTPATIENT)
Dept: ADMINISTRATIVE | Facility: HOSPITAL | Age: 47
End: 2017-11-15

## 2017-11-15 DIAGNOSIS — E03.9 HYPOTHYROIDISM, UNSPECIFIED TYPE: ICD-10-CM

## 2017-11-15 DIAGNOSIS — E78.5 HYPERLIPIDEMIA LDL GOAL <100: Primary | ICD-10-CM

## 2017-11-15 DIAGNOSIS — Z12.39 ENCOUNTER FOR SPECIAL SCREENING EXAMINATION FOR NEOPLASM OF BREAST: ICD-10-CM

## 2017-11-15 DIAGNOSIS — E55.9 VITAMIN D DEFICIENCY: ICD-10-CM

## 2017-11-20 RX ORDER — LEVOCETIRIZINE DIHYDROCHLORIDE 5 MG/1
TABLET, FILM COATED ORAL
Qty: 30 TABLET | Refills: 1 | Status: SHIPPED | OUTPATIENT
Start: 2017-11-20 | End: 2017-11-28 | Stop reason: SDUPTHER

## 2017-11-28 ENCOUNTER — LAB VISIT (OUTPATIENT)
Dept: LAB | Facility: HOSPITAL | Age: 47
End: 2017-11-28
Attending: FAMILY MEDICINE
Payer: COMMERCIAL

## 2017-11-28 ENCOUNTER — OFFICE VISIT (OUTPATIENT)
Dept: INTERNAL MEDICINE | Facility: CLINIC | Age: 47
End: 2017-11-28
Payer: COMMERCIAL

## 2017-11-28 VITALS
WEIGHT: 150.81 LBS | BODY MASS INDEX: 25.75 KG/M2 | HEIGHT: 64 IN | TEMPERATURE: 98 F | SYSTOLIC BLOOD PRESSURE: 138 MMHG | DIASTOLIC BLOOD PRESSURE: 88 MMHG | HEART RATE: 72 BPM

## 2017-11-28 DIAGNOSIS — E78.5 HYPERLIPIDEMIA LDL GOAL <100: ICD-10-CM

## 2017-11-28 DIAGNOSIS — T36.95XA ANTIBIOTIC-INDUCED YEAST INFECTION: ICD-10-CM

## 2017-11-28 DIAGNOSIS — J30.1 CHRONIC ALLERGIC RHINITIS DUE TO POLLEN, UNSPECIFIED SEASONALITY: ICD-10-CM

## 2017-11-28 DIAGNOSIS — E03.9 HYPOTHYROIDISM, UNSPECIFIED TYPE: ICD-10-CM

## 2017-11-28 DIAGNOSIS — E07.9 THYROID DISORDER: ICD-10-CM

## 2017-11-28 DIAGNOSIS — B37.9 ANTIBIOTIC-INDUCED YEAST INFECTION: ICD-10-CM

## 2017-11-28 DIAGNOSIS — E55.9 VITAMIN D DEFICIENCY: ICD-10-CM

## 2017-11-28 DIAGNOSIS — Z00.00 ROUTINE GENERAL MEDICAL EXAMINATION AT A HEALTH CARE FACILITY: Primary | ICD-10-CM

## 2017-11-28 LAB
25(OH)D3+25(OH)D2 SERPL-MCNC: 23 NG/ML
ALBUMIN SERPL BCP-MCNC: 3.8 G/DL
ALP SERPL-CCNC: 108 U/L
ALT SERPL W/O P-5'-P-CCNC: 40 U/L
ANION GAP SERPL CALC-SCNC: 6 MMOL/L
AST SERPL-CCNC: 28 U/L
BASOPHILS # BLD AUTO: 0.03 K/UL
BASOPHILS NFR BLD: 0.5 %
BILIRUB SERPL-MCNC: 0.6 MG/DL
BUN SERPL-MCNC: 13 MG/DL
CALCIUM SERPL-MCNC: 9.6 MG/DL
CHLORIDE SERPL-SCNC: 104 MMOL/L
CHOLEST SERPL-MCNC: 226 MG/DL
CHOLEST/HDLC SERPL: 4.3 {RATIO}
CO2 SERPL-SCNC: 28 MMOL/L
CREAT SERPL-MCNC: 0.8 MG/DL
DIFFERENTIAL METHOD: ABNORMAL
EOSINOPHIL # BLD AUTO: 0.1 K/UL
EOSINOPHIL NFR BLD: 1.3 %
ERYTHROCYTE [DISTWIDTH] IN BLOOD BY AUTOMATED COUNT: 11.5 %
EST. GFR  (AFRICAN AMERICAN): >60 ML/MIN/1.73 M^2
EST. GFR  (NON AFRICAN AMERICAN): >60 ML/MIN/1.73 M^2
GLUCOSE SERPL-MCNC: 93 MG/DL
HCT VFR BLD AUTO: 40.2 %
HDLC SERPL-MCNC: 52 MG/DL
HDLC SERPL: 23 %
HGB BLD-MCNC: 12.8 G/DL
IMM GRANULOCYTES # BLD AUTO: 0.01 K/UL
IMM GRANULOCYTES NFR BLD AUTO: 0.2 %
LDLC SERPL CALC-MCNC: 143.6 MG/DL
LYMPHOCYTES # BLD AUTO: 1.5 K/UL
LYMPHOCYTES NFR BLD: 23.4 %
MCH RBC QN AUTO: 31.8 PG
MCHC RBC AUTO-ENTMCNC: 31.8 G/DL
MCV RBC AUTO: 100 FL
MONOCYTES # BLD AUTO: 0.4 K/UL
MONOCYTES NFR BLD: 6.8 %
NEUTROPHILS # BLD AUTO: 4.3 K/UL
NEUTROPHILS NFR BLD: 67.8 %
NONHDLC SERPL-MCNC: 174 MG/DL
NRBC BLD-RTO: 0 /100 WBC
PLATELET # BLD AUTO: 227 K/UL
PMV BLD AUTO: 11.8 FL
POTASSIUM SERPL-SCNC: 4.4 MMOL/L
PROT SERPL-MCNC: 7.7 G/DL
RBC # BLD AUTO: 4.02 M/UL
SODIUM SERPL-SCNC: 138 MMOL/L
T4 FREE SERPL-MCNC: 1.13 NG/DL
TRIGL SERPL-MCNC: 152 MG/DL
TSH SERPL DL<=0.005 MIU/L-ACNC: 1.98 UIU/ML
WBC # BLD AUTO: 6.32 K/UL

## 2017-11-28 PROCEDURE — 84439 ASSAY OF FREE THYROXINE: CPT

## 2017-11-28 PROCEDURE — 80053 COMPREHEN METABOLIC PANEL: CPT

## 2017-11-28 PROCEDURE — 99396 PREV VISIT EST AGE 40-64: CPT | Mod: S$GLB,,, | Performed by: FAMILY MEDICINE

## 2017-11-28 PROCEDURE — 36415 COLL VENOUS BLD VENIPUNCTURE: CPT | Mod: PO

## 2017-11-28 PROCEDURE — 84443 ASSAY THYROID STIM HORMONE: CPT

## 2017-11-28 PROCEDURE — 82306 VITAMIN D 25 HYDROXY: CPT

## 2017-11-28 PROCEDURE — 99999 PR PBB SHADOW E&M-EST. PATIENT-LVL III: CPT | Mod: PBBFAC,,, | Performed by: FAMILY MEDICINE

## 2017-11-28 PROCEDURE — 80061 LIPID PANEL: CPT

## 2017-11-28 PROCEDURE — 85025 COMPLETE CBC W/AUTO DIFF WBC: CPT

## 2017-11-28 RX ORDER — FLUTICASONE PROPIONATE 50 MCG
1 SPRAY, SUSPENSION (ML) NASAL DAILY
Qty: 1 BOTTLE | Refills: 11 | Status: SHIPPED | OUTPATIENT
Start: 2017-11-28 | End: 2018-12-10 | Stop reason: SDUPTHER

## 2017-11-28 RX ORDER — LEVOCETIRIZINE DIHYDROCHLORIDE 5 MG/1
5 TABLET, FILM COATED ORAL NIGHTLY
Qty: 30 TABLET | Refills: 11 | Status: SHIPPED | OUTPATIENT
Start: 2017-11-28 | End: 2018-12-10 | Stop reason: SDUPTHER

## 2017-11-28 RX ORDER — FLUCONAZOLE 150 MG/1
150 TABLET ORAL DAILY
Qty: 1 TABLET | Refills: 0 | Status: SHIPPED | OUTPATIENT
Start: 2017-11-28 | End: 2017-11-29

## 2017-11-28 NOTE — PROGRESS NOTES
Subjective:      Patient ID: Shane Mckeon is a 47 y.o. female.    Chief Complaint: Annual Exam (want labs done)    Patient is a 47-year-old coming in today for prevention exam.  She got off her cholesterol and thyroid medicines in the last 5 months.  Wanting to do blood work today to see if she needs to get back on them.  3 weeks ago had knee surgery with Dr. calvin in the right knee.  Currently on crutches.  Not able to drive or walk with him.  Seeing him later this week.  Is still on ALLERGY medicines of Flonase in size all.  Is still on her vitamin D and 1000 units over-the-counter.  Previously was seeing a gynecologist for her thyroid medication has had a hysterectomy.  Has a strong family history of ischemic heart disease.  Recently was also on a prolonged course of about 3 weeks for antibiotics due to her knee surgery.  Now having yeast infection.  Will like to have prescription medication called in because over the counters are not working.        Lab Results   Component Value Date    WBC 6.90 10/25/2017    HGB 13.0 10/25/2017    HCT 40.4 10/25/2017     10/25/2017    CHOL 176 01/17/2017    TRIG 154 (H) 01/17/2017    HDL 43 01/17/2017    ALT 27 01/17/2017    AST 28 01/17/2017     10/25/2017    K 4.1 10/25/2017     10/25/2017    CREATININE 0.7 10/25/2017    BUN 11 10/25/2017    CO2 27 10/25/2017    TSH 1.646 02/10/2014       Review of Systems   Constitutional: Negative for chills, fatigue and fever.   HENT: Negative for ear pain and trouble swallowing.    Eyes: Negative for pain and visual disturbance.   Respiratory: Negative for cough and shortness of breath.    Cardiovascular: Negative for chest pain and leg swelling.   Gastrointestinal: Negative for abdominal pain, blood in stool, nausea and vomiting.   Endocrine: Negative for cold intolerance and heat intolerance.   Genitourinary: Positive for vaginal discharge. Negative for dysuria and frequency.   Musculoskeletal: Positive for  arthralgias and gait problem. Negative for joint swelling, myalgias and neck pain.   Skin: Negative for color change and rash.   Neurological: Negative for dizziness and headaches.   Psychiatric/Behavioral: Negative for behavioral problems and sleep disturbance.     Objective:     Physical Exam   Constitutional: She is oriented to person, place, and time. She appears well-developed and well-nourished.   Using crutches today   HENT:   Head: Normocephalic and atraumatic.   Right Ear: External ear normal.   Left Ear: External ear normal.   Mouth/Throat: Oropharynx is clear and moist.   Eyes: EOM are normal.   Neck: Normal range of motion. Neck supple. No thyromegaly present.   Cardiovascular: Normal rate and regular rhythm.  Exam reveals no gallop and no friction rub.    No murmur heard.  Pulmonary/Chest: Effort normal. No respiratory distress. She has no wheezes. She has no rales.   Abdominal: Soft. Bowel sounds are normal. She exhibits no distension. There is no tenderness. There is no rebound.   Musculoskeletal: Normal range of motion. She exhibits no edema.   Lymphadenopathy:     She has no cervical adenopathy.   Neurological: She is alert and oriented to person, place, and time.   Skin: Skin is warm and dry. No rash noted.   Psychiatric: She has a normal mood and affect. Her behavior is normal. Judgment and thought content normal.   Vitals reviewed.    Assessment:     1. Routine general medical examination at a health care facility    2. Hyperlipidemia LDL goal <100    3. Thyroid disorder    4. Antibiotic-induced yeast infection    5. Chronic allergic rhinitis due to pollen, unspecified seasonality      Plan:   Shane was seen today for annual exam.    Diagnoses and all orders for this visit:    Routine general medical examination at a health care facility-labs today has had a hysterectomy.  Not wanting a flu shot.  Send in refills of thyroid medication cholesterol medicine if noted to be  elevated    Hyperlipidemia LDL goal <100  Comments:  pt stopped meds over 5 months ago. wanting to recheck labs and willing to restart if needed.  Was briefly on pravastatin 20    Thyroid disorder  Comments:  pt stopped meds over 5 months ago. wanting to recheck labs and willing to restart if needed.  Was previously on levothyroxin 25    Antibiotic-induced yeast infection-due to recent anabiotic's to orthopedics  Comments:  pt requests diflucan. not responding to otc meds. recently did abx from knee surgery.     Chronic allergic rhinitis due to pollen, unspecified seasonality-refilled medication  -     fluticasone (FLONASE) 50 mcg/actuation nasal spray; 1 spray by Each Nare route Daily.    Other orders  -     fluconazole (DIFLUCAN) 150 MG Tab; Take 1 tablet (150 mg total) by mouth once daily.  -     levocetirizine (XYZAL) 5 MG tablet; Take 1 tablet (5 mg total) by mouth every evening.            Return in about 1 year (around 11/28/2018) for physical with Dr BUSTAMANTE.

## 2017-11-30 ENCOUNTER — PATIENT MESSAGE (OUTPATIENT)
Dept: INTERNAL MEDICINE | Facility: CLINIC | Age: 47
End: 2017-11-30

## 2017-11-30 NOTE — TELEPHONE ENCOUNTER
Vitamin D  Levels are low. Need to start 2000 IU of vitamin D3 daily from over the counter. Otherwise your cholesterol, sugar levels, blood counts, kidney functions, liver functions, and thyroid functions are within goal ranges. You do not have to restart your thyroid or cholesterol meds. Please continue on your other current medications. Please let me know if you have further questions.   Nika Zavala MD

## 2017-11-30 NOTE — TELEPHONE ENCOUNTER
Called pt and let her know that Vitamin D  Levels are low. Need to start 2000 IU of vitamin D3 daily from over the counter. Otherwise your cholesterol, sugar levels, blood counts, kidney functions, liver functions, and thyroid functions are within goal ranges. You do not have to restart your thyroid or cholesterol meds. Please continue on your other current medications per Dr. Zavala.

## 2017-12-11 ENCOUNTER — OFFICE VISIT (OUTPATIENT)
Dept: URGENT CARE | Facility: CLINIC | Age: 47
End: 2017-12-11
Payer: COMMERCIAL

## 2017-12-11 VITALS
HEART RATE: 108 BPM | BODY MASS INDEX: 24.41 KG/M2 | WEIGHT: 151.88 LBS | OXYGEN SATURATION: 100 % | TEMPERATURE: 99 F | SYSTOLIC BLOOD PRESSURE: 120 MMHG | DIASTOLIC BLOOD PRESSURE: 80 MMHG | HEIGHT: 66 IN

## 2017-12-11 DIAGNOSIS — J06.9 UPPER RESPIRATORY TRACT INFECTION, UNSPECIFIED TYPE: Primary | ICD-10-CM

## 2017-12-11 DIAGNOSIS — J02.9 PHARYNGITIS, UNSPECIFIED ETIOLOGY: ICD-10-CM

## 2017-12-11 LAB
CTP QC/QA: YES
S PYO RRNA THROAT QL PROBE: NEGATIVE

## 2017-12-11 PROCEDURE — 99999 PR PBB SHADOW E&M-EST. PATIENT-LVL V: CPT | Mod: PBBFAC,,, | Performed by: NURSE PRACTITIONER

## 2017-12-11 PROCEDURE — 99214 OFFICE O/P EST MOD 30 MIN: CPT | Mod: S$GLB,,, | Performed by: NURSE PRACTITIONER

## 2017-12-11 PROCEDURE — 87880 STREP A ASSAY W/OPTIC: CPT | Mod: QW,S$GLB,, | Performed by: NURSE PRACTITIONER

## 2017-12-11 PROCEDURE — 87147 CULTURE TYPE IMMUNOLOGIC: CPT

## 2017-12-11 PROCEDURE — 87081 CULTURE SCREEN ONLY: CPT

## 2017-12-11 NOTE — PATIENT INSTRUCTIONS
Viral Upper Respiratory Illness (Adult)  You have a viral upper respiratory illness (URI), which is another term for the common cold. This illness is contagious during the first few days. It is spread through the air by coughing and sneezing. It may also be spread by direct contact (touching the sick person and then touching your own eyes, nose, or mouth). Frequent handwashing will decrease risk of spread. Most viral illnesses go away within 7 to 10 days with rest and simple home remedies. Sometimes the illness may last for several weeks. Antibiotics will not kill a virus, and they are generally not prescribed for this condition.    Home care  · If symptoms are severe, rest at home for the first 2 to 3 days. When you resume activity, don't let yourself get too tired.  · Avoid being exposed to cigarette smoke (yours or others).  · You may use acetaminophen or ibuprofen to control pain and fever, unless another medicine was prescribed. (Note: If you have chronic liver or kidney disease, have ever had a stomach ulcer or gastrointestinal bleeding, or are taking blood-thinning medicines, talk with your healthcare provider before using these medicines.) Aspirin should never be given to anyone under 18 years of age who is ill with a viral infection or fever. It may cause severe liver or brain damage.  · Your appetite may be poor, so a light diet is fine. Avoid dehydration by drinking 6 to 8 glasses of fluids per day (water, soft drinks, juices, tea, or soup). Extra fluids will help loosen secretions in the nose and lungs.  · Over-the-counter cold medicines will not shorten the length of time youre sick, but they may be helpful for the following symptoms: cough, sore throat, and nasal and sinus congestion. (Note: Do not use decongestants if you have high blood pressure.)  Follow-up care  Follow up with your healthcare provider, or as advised.  When to seek medical advice  Call your healthcare provider right away if any  of these occur:  · Cough with lots of colored sputum (mucus)  · Severe headache; face, neck, or ear pain  · Difficulty swallowing due to throat pain  · Fever of 100.4°F (38°C)  Call 911, or get immediate medical care  Call emergency services right away if any of these occur:  · Chest pain, shortness of breath, wheezing, or difficulty breathing  · Coughing up blood  · Inability to swallow due to throat pain  Date Last Reviewed: 9/13/2015 © 2000-2017 [employer]. [address] [place] [id number]. All rights reserved. This information is not intended as a substitute for professional medical care. Always follow your healthcare professional's instructions.        Viral Upper Respiratory Illness (Adult)  You have a viral upper respiratory illness (URI), which is another term for the common cold. This illness is contagious during the first few days. It is spread through the air by coughing and sneezing. It may also be spread by direct contact (touching the sick person and then touching your own eyes, nose, or mouth). Frequent handwashing will decrease risk of spread. Most viral illnesses go away within 7 to 10 days with rest and simple home remedies. Sometimes the illness may last for several weeks. Antibiotics will not kill a virus, and they are generally not prescribed for this condition.    Home care  · If symptoms are severe, rest at home for the first 2 to 3 days. When you resume activity, don't let yourself get too tired.  · Avoid being exposed to cigarette smoke (yours or others).  · You may use acetaminophen or ibuprofen to control pain and fever, unless another medicine was prescribed. (Note: If you have chronic liver or kidney disease, have ever had a stomach ulcer or gastrointestinal bleeding, or are taking blood-thinning medicines, talk with your healthcare provider before using these medicines.) Aspirin should never be given to anyone under 18 years of age who is ill with a viral  infection or fever. It may cause severe liver or brain damage.  · Your appetite may be poor, so a light diet is fine. Avoid dehydration by drinking 6 to 8 glasses of fluids per day (water, soft drinks, juices, tea, or soup). Extra fluids will help loosen secretions in the nose and lungs.  · Over-the-counter cold medicines will not shorten the length of time youre sick, but they may be helpful for the following symptoms: cough, sore throat, and nasal and sinus congestion. (Note: Do not use decongestants if you have high blood pressure.)  Follow-up care  Follow up with your healthcare provider, or as advised.  When to seek medical advice  Call your healthcare provider right away if any of these occur:  · Cough with lots of colored sputum (mucus)  · Severe headache; face, neck, or ear pain  · Difficulty swallowing due to throat pain  · Fever of 100.4°F (38°C)  Call 911, or get immediate medical care  Call emergency services right away if any of these occur:  · Chest pain, shortness of breath, wheezing, or difficulty breathing  · Coughing up blood  · Inability to swallow due to throat pain  Date Last Reviewed: 9/13/2015  © 6016-2675 [employer]. [address] [place] [address], [place] [address]. All rights reserved. This information is not intended as a substitute for professional medical care. Always follow your healthcare professional's instructions.

## 2017-12-11 NOTE — PROGRESS NOTES
Subjective:       Patient ID: Shane Mckeon is a 47 y.o. female.    Chief Complaint: URI    47 year old female presents to Urgent Care with reports of cough, nasal congestion and sore throat that has been present since Friday. Denies any other problems or concerns at this time.       URI    This is a new problem. The current episode started in the past 7 days. The problem has been unchanged. There has been no fever. Associated symptoms include congestion and a sore throat. Pertinent negatives include no abdominal pain, chest pain, diarrhea, dysuria, ear pain, nausea, rash or vomiting. She has tried nothing for the symptoms.     Review of Systems   Constitutional: Negative for appetite change, chills and fever.   HENT: Positive for congestion and sore throat. Negative for ear pain, sinus pressure and trouble swallowing.    Eyes: Negative for visual disturbance.   Respiratory: Negative for shortness of breath.    Cardiovascular: Negative for chest pain.   Gastrointestinal: Negative for abdominal pain, diarrhea, nausea and vomiting.   Endocrine: Negative for cold intolerance, polyphagia and polyuria.   Genitourinary: Negative for decreased urine volume and dysuria.   Musculoskeletal: Negative for back pain.   Skin: Negative for rash.   Allergic/Immunologic: Negative for environmental allergies and food allergies.   Neurological: Negative for dizziness, tremors, weakness and numbness.   Hematological: Does not bruise/bleed easily.   Psychiatric/Behavioral: Negative for confusion and hallucinations. The patient is not nervous/anxious and is not hyperactive.    All other systems reviewed and are negative.      Objective:     Physical Exam   Constitutional: She is oriented to person, place, and time. She appears well-developed and well-nourished.   HENT:   Head: Normocephalic and atraumatic.   Right Ear: External ear normal.   Left Ear: External ear normal.   Nose: Nose normal.   Mouth/Throat: Uvula is midline.  Posterior oropharyngeal erythema present.   Eyes: Conjunctivae and EOM are normal. Pupils are equal, round, and reactive to light.   Neck: Normal range of motion. Neck supple.   Cardiovascular: Normal rate, regular rhythm, normal heart sounds and intact distal pulses.    No murmur heard.  Pulmonary/Chest: Effort normal. She has decreased breath sounds in the right upper field and the right lower field. She has no wheezes.   Abdominal: Soft. Bowel sounds are normal. There is no tenderness.   Musculoskeletal: Normal range of motion.   Neurological: She is alert and oriented to person, place, and time. She has normal reflexes.   Skin: Skin is warm and dry. No rash noted.   Psychiatric: She has a normal mood and affect. Her behavior is normal. Judgment and thought content normal.   Nursing note and vitals reviewed.    Assessment:     1. Upper respiratory tract infection, unspecified type    2. Pharyngitis, unspecified etiology      Plan:   Shane was seen today for uri.    Diagnoses and all orders for this visit:    Upper respiratory tract infection, unspecified type    Pharyngitis, unspecified etiology  -     POCT rapid strep A  -     Strep A culture, throat    Other orders  -     brompheniramine-pseudoephedrine-dextromethorphan (DIMETAPP DM) 1-15-5 mg/5 mL Elix; Take 10 mLs by mouth every 6 (six) hours as needed.

## 2017-12-14 LAB — BACTERIA THROAT CULT: NORMAL

## 2018-12-10 DIAGNOSIS — J30.1 CHRONIC ALLERGIC RHINITIS DUE TO POLLEN: ICD-10-CM

## 2018-12-10 RX ORDER — FLUTICASONE PROPIONATE 50 MCG
SPRAY, SUSPENSION (ML) NASAL
Qty: 16 G | Refills: 0 | Status: SHIPPED | OUTPATIENT
Start: 2018-12-10 | End: 2019-04-16 | Stop reason: SDUPTHER

## 2018-12-10 RX ORDER — LEVOCETIRIZINE DIHYDROCHLORIDE 5 MG/1
TABLET, FILM COATED ORAL
Qty: 30 TABLET | Refills: 0 | Status: SHIPPED | OUTPATIENT
Start: 2018-12-10 | End: 2019-04-16 | Stop reason: SDUPTHER

## 2018-12-10 NOTE — TELEPHONE ENCOUNTER
Called pt and let her know that Dr. Zavala sent in one refill, will need appt with Dr. Zavala for further refills.

## 2019-01-21 RX ORDER — LEVOCETIRIZINE DIHYDROCHLORIDE 5 MG/1
TABLET, FILM COATED ORAL
Qty: 30 TABLET | Refills: 0 | OUTPATIENT
Start: 2019-01-21

## 2019-04-16 ENCOUNTER — OFFICE VISIT (OUTPATIENT)
Dept: INTERNAL MEDICINE | Facility: CLINIC | Age: 49
End: 2019-04-16
Payer: COMMERCIAL

## 2019-04-16 VITALS
SYSTOLIC BLOOD PRESSURE: 110 MMHG | HEART RATE: 60 BPM | BODY MASS INDEX: 23.84 KG/M2 | HEIGHT: 66 IN | DIASTOLIC BLOOD PRESSURE: 80 MMHG | WEIGHT: 148.38 LBS | TEMPERATURE: 97 F

## 2019-04-16 DIAGNOSIS — J30.1 CHRONIC ALLERGIC RHINITIS DUE TO POLLEN: ICD-10-CM

## 2019-04-16 DIAGNOSIS — T75.3XXA MOTION SICKNESS, INITIAL ENCOUNTER: ICD-10-CM

## 2019-04-16 DIAGNOSIS — J30.89 SEASONAL ALLERGIC RHINITIS DUE TO OTHER ALLERGIC TRIGGER: ICD-10-CM

## 2019-04-16 DIAGNOSIS — Z00.00 ROUTINE GENERAL MEDICAL EXAMINATION AT A HEALTH CARE FACILITY: Primary | ICD-10-CM

## 2019-04-16 PROCEDURE — 99999 PR PBB SHADOW E&M-EST. PATIENT-LVL III: CPT | Mod: PBBFAC,,, | Performed by: FAMILY MEDICINE

## 2019-04-16 PROCEDURE — 99396 PR PREVENTIVE VISIT,EST,40-64: ICD-10-PCS | Mod: S$GLB,,, | Performed by: FAMILY MEDICINE

## 2019-04-16 PROCEDURE — 99396 PREV VISIT EST AGE 40-64: CPT | Mod: S$GLB,,, | Performed by: FAMILY MEDICINE

## 2019-04-16 PROCEDURE — 99999 PR PBB SHADOW E&M-EST. PATIENT-LVL III: ICD-10-PCS | Mod: PBBFAC,,, | Performed by: FAMILY MEDICINE

## 2019-04-16 RX ORDER — AZITHROMYCIN 250 MG/1
TABLET, FILM COATED ORAL
Qty: 6 TABLET | Refills: 0 | Status: SHIPPED | OUTPATIENT
Start: 2019-04-16 | End: 2019-06-18

## 2019-04-16 RX ORDER — FLUTICASONE PROPIONATE 50 MCG
1 SPRAY, SUSPENSION (ML) NASAL DAILY
Qty: 16 G | Refills: 11 | Status: SHIPPED | OUTPATIENT
Start: 2019-04-16 | End: 2020-08-13 | Stop reason: SDUPTHER

## 2019-04-16 RX ORDER — ONDANSETRON 8 MG/1
8 TABLET, ORALLY DISINTEGRATING ORAL EVERY 12 HOURS PRN
Qty: 6 TABLET | Refills: 0 | Status: SHIPPED | OUTPATIENT
Start: 2019-04-16 | End: 2019-06-18

## 2019-04-16 RX ORDER — LEVOCETIRIZINE DIHYDROCHLORIDE 5 MG/1
5 TABLET, FILM COATED ORAL NIGHTLY
Qty: 30 TABLET | Refills: 11 | Status: SHIPPED | OUTPATIENT
Start: 2019-04-16 | End: 2020-05-13 | Stop reason: SDUPTHER

## 2019-04-16 RX ORDER — SCOLOPAMINE TRANSDERMAL SYSTEM 1 MG/1
1 PATCH, EXTENDED RELEASE TRANSDERMAL
Qty: 4 PATCH | Refills: 0 | Status: SHIPPED | OUTPATIENT
Start: 2019-04-16 | End: 2019-06-18

## 2019-04-16 NOTE — PROGRESS NOTES
Subjective:      Patient ID: Shane Mckeon is a 49 y.o. female.    Chief Complaint: Annual Exam and Sinus Problem    Disclaimer:  This note is prepared using voice recognition software and as such is likely to have errors and has not been proof read. Please contact me for questions.     Patient is a 49-year-old coming in today for prevention exam.  She is actually doing fairly well except she has been out of her allergy medicines for quite some time.  Normally is on Flonase and Xyzal.  Feels like she has a lot of sinus issues as well.  Plans on traveling to the [place] in 2 weeks and wants to be well.  Is concerned about potential for motion sickness.  Does normally see Gynecology and did a mammo last year through Dr. Margot donaldson.  No abnormalities noted.      Is needing to lipid profile nonfasting today.  Still has right knee pain and foot pain but not actively doing exercises.  Is doing some over-the-counter multivitamins and vitamin-D occasionally.          Lab Results   Component Value Date    WBC 6.32 11/28/2017    HGB 12.8 11/28/2017    HCT 40.2 11/28/2017     11/28/2017    CHOL 226 (H) 11/28/2017    TRIG 152 (H) 11/28/2017    HDL 52 11/28/2017    ALT 40 11/28/2017    AST 28 11/28/2017     11/28/2017    K 4.4 11/28/2017     11/28/2017    CREATININE 0.8 11/28/2017    BUN 13 11/28/2017    CO2 28 11/28/2017    TSH 1.981 11/28/2017       MRI Lower Extremity Joint WO Cont Right  Narrative: Technique: Multiplanar, multisequence MR images were performed of the right knee without contrast.     Comparison:  Plain films from 03/08/2017.    Results:     Menisci:  No signal abnormality to suggest meniscal tear.    Cruciate ligaments: No tear is seen. There is no evidence of mucoid degeneration.  Collateral ligaments: The medial and lateral collateral ligaments are intact.  Extensor mechanism: No tear is seen.  Cartilage: There is moderate cartilage loss near the patellar apex/medial  "patellar facet where there are some subchondral cystic changes also present.  Marrow: No signal abnormality is present to suggest a marrow replacement process.There is an osteochondral lesion involving the weightbearing surface of the lateral femoral condyle that measures up to 8 mm in the transverse dimension by 8 mm in the AP dimension.  There is some fragmentation/irregularity there is some cartilage loss of the subchondral bone in this region.  There is significant adjacent marrow edema.  Joint fluid and synovium: There is no effusion. No synovial abnormality is seen.  Impression: 1.  Osteochondral lesion involving the weightbearing surface of the lateral femoral condyle as described above.    2.  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    Electronically signed by: KATE HUERTA D.O.  Date:     03/31/17  Time:    16:21         Review of Systems   Constitutional: Negative for activity change, appetite change, chills, fatigue and fever.   HENT: Positive for congestion, postnasal drip, rhinorrhea, sinus pressure and sinus pain. Negative for ear pain and trouble swallowing.    Eyes: Negative for pain and visual disturbance.   Respiratory: Negative for cough and shortness of breath.    Cardiovascular: Negative for chest pain and leg swelling.   Gastrointestinal: Negative for abdominal pain, blood in stool, nausea and vomiting.   Endocrine: Negative for cold intolerance and heat intolerance.   Genitourinary: Negative for dysuria and frequency.   Musculoskeletal: Negative for joint swelling, myalgias and neck pain.   Skin: Negative for color change and rash.   Neurological: Negative for dizziness and headaches.   Psychiatric/Behavioral: Negative for behavioral problems and sleep disturbance.     Objective:     Vitals:    04/16/19 1301   BP: 110/80   Pulse: 60   Temp: 97.2 °F (36.2 °C)   Weight: 67.3 kg (148 lb 5.9 oz)   Height: 5' 6" (1.676 m)     Physical Exam   Constitutional: She is oriented " to person, place, and time. She appears well-developed and well-nourished.   HENT:   Head: Normocephalic and atraumatic.   Right Ear: Tympanic membrane and external ear normal.   Left Ear: Tympanic membrane and external ear normal.   Nose: Mucosal edema and rhinorrhea present.   Mouth/Throat: Posterior oropharyngeal erythema present.   Eyes: Conjunctivae and EOM are normal.   Neck: Normal range of motion. Neck supple. No thyromegaly present.   Cardiovascular: Normal rate and regular rhythm. Exam reveals no gallop and no friction rub.   No murmur heard.  Pulmonary/Chest: Effort normal and breath sounds normal. No respiratory distress. She has no wheezes. She has no rales.   Abdominal: Soft. Bowel sounds are normal. She exhibits no distension. There is no tenderness. There is no rebound.   Musculoskeletal: Normal range of motion. She exhibits no edema.   Lymphadenopathy:     She has no cervical adenopathy.   Neurological: She is alert and oriented to person, place, and time.   Skin: Skin is warm and dry. No rash noted.   Psychiatric: She has a normal mood and affect. Her behavior is normal. Judgment and thought content normal.   Vitals reviewed.    Assessment:     1. Routine general medical examination at a health care facility    2. Seasonal allergic rhinitis due to other allergic trigger    3. Chronic allergic rhinitis due to pollen      Plan:   Shane was seen today for annual exam and sinus problem.    Diagnoses and all orders for this visit:    Routine general medical examination at a health care facility - - labs ordered. Discussed Health Maintenance issues.     -     TSH; Future  -     Lipid panel; Future  -     Comprehensive metabolic panel; Future  -     CBC auto differential; Future  -     Vitamin D; Future    Seasonal allergic rhinitis due to other allergic trigger-restart Flonase restart Xyzal given printed prescription for Z-Gautam if not improving then can use it prior to her trip since I will be out of the  office next week.  Patient's agreement the plan.    Chronic allergic rhinitis due to pollen  -     fluticasone (FLONASE) 50 mcg/actuation nasal spray; 1 spray (50 mcg total) by Each Nare route once daily.    Motion sickness-given prescription for Transderm patches as well as Zofran to use on her upcoming travel due to motion sickness.  Gets to nauseated and sleepy with Dramamine or bonine  -     levocetirizine (XYZAL) 5 MG tablet; Take 1 tablet (5 mg total) by mouth every evening.            No follow-ups on file.    There are no Patient Instructions on file for this visit.

## 2019-04-23 ENCOUNTER — OFFICE VISIT (OUTPATIENT)
Dept: INTERNAL MEDICINE | Facility: CLINIC | Age: 49
End: 2019-04-23
Payer: COMMERCIAL

## 2019-04-23 ENCOUNTER — LAB VISIT (OUTPATIENT)
Dept: LAB | Facility: HOSPITAL | Age: 49
End: 2019-04-23
Attending: FAMILY MEDICINE
Payer: COMMERCIAL

## 2019-04-23 VITALS
DIASTOLIC BLOOD PRESSURE: 80 MMHG | WEIGHT: 146.81 LBS | BODY MASS INDEX: 25.06 KG/M2 | SYSTOLIC BLOOD PRESSURE: 120 MMHG | HEIGHT: 64 IN | TEMPERATURE: 97 F | HEART RATE: 80 BPM

## 2019-04-23 DIAGNOSIS — J06.9 ACUTE URI: Primary | ICD-10-CM

## 2019-04-23 DIAGNOSIS — Z00.00 ROUTINE GENERAL MEDICAL EXAMINATION AT A HEALTH CARE FACILITY: ICD-10-CM

## 2019-04-23 LAB
25(OH)D3+25(OH)D2 SERPL-MCNC: 28 NG/ML (ref 30–96)
ALBUMIN SERPL BCP-MCNC: 3.9 G/DL (ref 3.5–5.2)
ALP SERPL-CCNC: 101 U/L (ref 55–135)
ALT SERPL W/O P-5'-P-CCNC: 40 U/L (ref 10–44)
ANION GAP SERPL CALC-SCNC: 8 MMOL/L (ref 8–16)
AST SERPL-CCNC: 23 U/L (ref 10–40)
BASOPHILS # BLD AUTO: 0.04 K/UL (ref 0–0.2)
BASOPHILS NFR BLD: 0.7 % (ref 0–1.9)
BILIRUB SERPL-MCNC: 0.6 MG/DL (ref 0.1–1)
BUN SERPL-MCNC: 13 MG/DL (ref 6–20)
CALCIUM SERPL-MCNC: 9.8 MG/DL (ref 8.7–10.5)
CHLORIDE SERPL-SCNC: 106 MMOL/L (ref 95–110)
CHOLEST SERPL-MCNC: 215 MG/DL (ref 120–199)
CHOLEST/HDLC SERPL: 4.2 {RATIO} (ref 2–5)
CO2 SERPL-SCNC: 27 MMOL/L (ref 23–29)
CREAT SERPL-MCNC: 0.7 MG/DL (ref 0.5–1.4)
DIFFERENTIAL METHOD: ABNORMAL
EOSINOPHIL # BLD AUTO: 0.1 K/UL (ref 0–0.5)
EOSINOPHIL NFR BLD: 1.7 % (ref 0–8)
ERYTHROCYTE [DISTWIDTH] IN BLOOD BY AUTOMATED COUNT: 11.2 % (ref 11.5–14.5)
EST. GFR  (AFRICAN AMERICAN): >60 ML/MIN/1.73 M^2
EST. GFR  (NON AFRICAN AMERICAN): >60 ML/MIN/1.73 M^2
GLUCOSE SERPL-MCNC: 83 MG/DL (ref 70–110)
HCT VFR BLD AUTO: 41.8 % (ref 37–48.5)
HDLC SERPL-MCNC: 51 MG/DL (ref 40–75)
HDLC SERPL: 23.7 % (ref 20–50)
HGB BLD-MCNC: 13 G/DL (ref 12–16)
IMM GRANULOCYTES # BLD AUTO: 0.03 K/UL (ref 0–0.04)
IMM GRANULOCYTES NFR BLD AUTO: 0.5 % (ref 0–0.5)
LDLC SERPL CALC-MCNC: 143.4 MG/DL (ref 63–159)
LYMPHOCYTES # BLD AUTO: 1.5 K/UL (ref 1–4.8)
LYMPHOCYTES NFR BLD: 25.3 % (ref 18–48)
MCH RBC QN AUTO: 31.8 PG (ref 27–31)
MCHC RBC AUTO-ENTMCNC: 31.1 G/DL (ref 32–36)
MCV RBC AUTO: 102 FL (ref 82–98)
MONOCYTES # BLD AUTO: 0.5 K/UL (ref 0.3–1)
MONOCYTES NFR BLD: 8.5 % (ref 4–15)
NEUTROPHILS # BLD AUTO: 3.7 K/UL (ref 1.8–7.7)
NEUTROPHILS NFR BLD: 63.3 % (ref 38–73)
NONHDLC SERPL-MCNC: 164 MG/DL
NRBC BLD-RTO: 0 /100 WBC
PLATELET # BLD AUTO: 220 K/UL (ref 150–350)
PMV BLD AUTO: 11.4 FL (ref 9.2–12.9)
POTASSIUM SERPL-SCNC: 5.4 MMOL/L (ref 3.5–5.1)
PROT SERPL-MCNC: 7.6 G/DL (ref 6–8.4)
RBC # BLD AUTO: 4.09 M/UL (ref 4–5.4)
SODIUM SERPL-SCNC: 141 MMOL/L (ref 136–145)
TRIGL SERPL-MCNC: 103 MG/DL (ref 30–150)
TSH SERPL DL<=0.005 MIU/L-ACNC: 1.58 UIU/ML (ref 0.4–4)
WBC # BLD AUTO: 5.86 K/UL (ref 3.9–12.7)

## 2019-04-23 PROCEDURE — 99213 OFFICE O/P EST LOW 20 MIN: CPT | Mod: 25,S$GLB,, | Performed by: PHYSICIAN ASSISTANT

## 2019-04-23 PROCEDURE — 80053 COMPREHEN METABOLIC PANEL: CPT

## 2019-04-23 PROCEDURE — 85025 COMPLETE CBC W/AUTO DIFF WBC: CPT

## 2019-04-23 PROCEDURE — 99999 PR PBB SHADOW E&M-EST. PATIENT-LVL III: ICD-10-PCS | Mod: PBBFAC,,, | Performed by: PHYSICIAN ASSISTANT

## 2019-04-23 PROCEDURE — 36415 COLL VENOUS BLD VENIPUNCTURE: CPT | Mod: PO

## 2019-04-23 PROCEDURE — 84443 ASSAY THYROID STIM HORMONE: CPT

## 2019-04-23 PROCEDURE — 80061 LIPID PANEL: CPT

## 2019-04-23 PROCEDURE — 96372 THER/PROPH/DIAG INJ SC/IM: CPT | Mod: S$GLB,,, | Performed by: PHYSICIAN ASSISTANT

## 2019-04-23 PROCEDURE — 99999 PR PBB SHADOW E&M-EST. PATIENT-LVL III: CPT | Mod: PBBFAC,,, | Performed by: PHYSICIAN ASSISTANT

## 2019-04-23 PROCEDURE — 96372 PR INJECTION,THERAP/PROPH/DIAG2ST, IM OR SUBCUT: ICD-10-PCS | Mod: S$GLB,,, | Performed by: PHYSICIAN ASSISTANT

## 2019-04-23 PROCEDURE — 99213 PR OFFICE/OUTPT VISIT, EST, LEVL III, 20-29 MIN: ICD-10-PCS | Mod: 25,S$GLB,, | Performed by: PHYSICIAN ASSISTANT

## 2019-04-23 PROCEDURE — 82306 VITAMIN D 25 HYDROXY: CPT

## 2019-04-23 RX ORDER — TRIAMCINOLONE ACETONIDE 40 MG/ML
60 INJECTION, SUSPENSION INTRA-ARTICULAR; INTRAMUSCULAR ONCE
Status: COMPLETED | OUTPATIENT
Start: 2019-04-23 | End: 2019-04-23

## 2019-04-23 RX ADMIN — TRIAMCINOLONE ACETONIDE 60 MG: 40 INJECTION, SUSPENSION INTRA-ARTICULAR; INTRAMUSCULAR at 02:04

## 2019-04-23 NOTE — PROGRESS NOTES
Subjective:       Patient ID: Shane Mckeon is a 49 y.o. female.    Chief Complaint: Follow-up    URI    This is a recurrent problem. The current episode started in the past 7 days. The problem has been unchanged. There has been no fever. Associated symptoms include congestion, coughing, headaches (sinus HA), a plugged ear sensation, rhinorrhea and sneezing. Pertinent negatives include no chest pain. Associated symptoms comments: Allergy symptoms   Saw PCP recently as well . She has tried antihistamine and decongestant for the symptoms.       Health Maintenance Due   Topic Date Due    Mammogram  08/01/2018       Past Medical History:   Diagnosis Date    Anxiety     Chest pain syndrome 11/5/2014    Ovarian cyst        Current Outpatient Medications   Medication Sig Dispense Refill    fluticasone (FLONASE) 50 mcg/actuation nasal spray 1 spray (50 mcg total) by Each Nare route once daily. 16 g 11    levocetirizine (XYZAL) 5 MG tablet Take 1 tablet (5 mg total) by mouth every evening. 30 tablet 11    multivitamin (ONE DAILY MULTIVITAMIN) per tablet Take 1 tablet by mouth once daily.      vitamin D 1000 units Tab Take 2,000 mg by mouth once daily.      albuterol 90 mcg/actuation inhaler Inhale 2 puffs into the lungs every 4 (four) hours as needed for Shortness of Breath. Rescue 18 g 0    aspirin (ECOTRIN) 81 MG EC tablet Take 1 tablet (81 mg total) by mouth once daily. (Patient taking differently: Take 81 mg by mouth every other day. ) 30 tablet 0    azithromycin (ZITHROMAX Z-JUNE) 250 MG tablet ZPAK, take as directed, 2 tablets po day 1, then 1 tablet po day 2-5 6 tablet 0    ondansetron (ZOFRAN-ODT) 8 MG TbDL Take 1 tablet (8 mg total) by mouth every 12 (twelve) hours as needed. 6 tablet 0    scopolamine (TRANSDERM-SCOP) 1.3-1.5 mg (1 mg over 3 days) Place 1 patch onto the skin every 72 hours. 4 patch 0     No current facility-administered medications for this visit.        Review of Systems   HENT:  "Positive for congestion, rhinorrhea and sneezing.    Respiratory: Positive for cough.    Cardiovascular: Negative for chest pain.   Neurological: Positive for headaches (sinus HA).       Objective:   /80   Pulse 80   Temp 97.2 °F (36.2 °C) (Tympanic)   Ht 5' 4" (1.626 m)   Wt 66.6 kg (146 lb 13.2 oz)   BMI 25.20 kg/m²      Physical Exam   Constitutional: She is oriented to person, place, and time. She appears well-developed and well-nourished. No distress.   HENT:   Head: Normocephalic and atraumatic.   Eyes: Pupils are equal, round, and reactive to light. EOM are normal.   Neck: Normal range of motion. Neck supple.   Cardiovascular: Normal rate and regular rhythm.   Pulmonary/Chest: Effort normal.   Abdominal: Soft.   Musculoskeletal: She exhibits no edema.   Neurological: She is alert and oriented to person, place, and time.   Skin: Capillary refill takes less than 2 seconds.   Psychiatric: She has a normal mood and affect. Her behavior is normal.         Lab Results   Component Value Date    WBC 5.86 04/23/2019    HGB 13.0 04/23/2019    HCT 41.8 04/23/2019     04/23/2019    CHOL 215 (H) 04/23/2019    TRIG 103 04/23/2019    HDL 51 04/23/2019    ALT 40 04/23/2019    AST 23 04/23/2019     04/23/2019    K 5.4 (H) 04/23/2019     04/23/2019    CREATININE 0.7 04/23/2019    BUN 13 04/23/2019    CO2 27 04/23/2019    TSH 1.584 04/23/2019       Assessment:       1. Acute URI        Plan:   Acute URI    Other orders  -     triamcinolone acetonide injection 60 mg      Steroid for recurrent allergy issues   avoid OTC medication for 48 hours     "

## 2019-05-10 ENCOUNTER — PATIENT OUTREACH (OUTPATIENT)
Dept: ADMINISTRATIVE | Facility: HOSPITAL | Age: 49
End: 2019-05-10

## 2019-06-17 ENCOUNTER — TELEPHONE (OUTPATIENT)
Dept: INTERNAL MEDICINE | Facility: CLINIC | Age: 49
End: 2019-06-17

## 2019-06-17 NOTE — TELEPHONE ENCOUNTER
----- Message from Victoria Duffy sent at 6/17/2019  3:10 PM CDT -----  Contact: [facility]/Pam  Please call [phone] regarding fax received today on pt visit the weekend., need to clarify information, copy if not clear.

## 2019-06-17 NOTE — TELEPHONE ENCOUNTER
If ct didn't show kidney stones then not likely the issue. Can see primary care here first to discuss further. Any provider

## 2019-06-17 NOTE — TELEPHONE ENCOUNTER
----- Message from Olive Ramirez sent at 6/17/2019  1:38 PM CDT -----  Contact: patient  Type:  Needs Medical Advice    Who Called: patient  Symptoms (please be specific): kidney stones   How long has patient had these symptoms:  weekend  Pharmacy name and phone #:  -  Would the patient rather a call back or a response via [facility]? call  Best Call Back Number: [phone]  Additional Information: In pain and need to know what she should do. Please call patient ASAP Today. Thanks, jahaira

## 2019-06-17 NOTE — TELEPHONE ENCOUNTER
----- Message from Neha Cronin sent at 6/17/2019  7:45 AM CDT -----  Contact: Patient  Patient called and stated she went to the ER this past weekend and had kidney stones. She had another episode today. She wanted to know if she should come in to see Dr. Zavala or be referred to Urology. She can be contacted at [phone].    Thanks,  Neha

## 2019-06-17 NOTE — TELEPHONE ENCOUNTER
Will request records. Pt went to the [facility] right up road. She was told possible kidney stones. CT scan did not show anything. Told to follow up with PCP that would possibly need to see urology.

## 2019-06-18 ENCOUNTER — HOSPITAL ENCOUNTER (OUTPATIENT)
Dept: RADIOLOGY | Facility: HOSPITAL | Age: 49
Discharge: HOME | End: 2019-06-18
Attending: FAMILY MEDICINE
Payer: COMMERCIAL

## 2019-06-18 ENCOUNTER — OFFICE VISIT (OUTPATIENT)
Dept: INTERNAL MEDICINE | Facility: CLINIC | Age: 49
End: 2019-06-18
Payer: COMMERCIAL

## 2019-06-18 VITALS
WEIGHT: 151 LBS | HEIGHT: 64 IN | HEART RATE: 74 BPM | BODY MASS INDEX: 25.78 KG/M2 | SYSTOLIC BLOOD PRESSURE: 120 MMHG | TEMPERATURE: 98 F | DIASTOLIC BLOOD PRESSURE: 80 MMHG

## 2019-06-18 DIAGNOSIS — R10.31 RLQ ABDOMINAL PAIN: ICD-10-CM

## 2019-06-18 DIAGNOSIS — R31.9 HEMATURIA, UNSPECIFIED TYPE: ICD-10-CM

## 2019-06-18 DIAGNOSIS — R10.31 RLQ ABDOMINAL PAIN: Primary | ICD-10-CM

## 2019-06-18 DIAGNOSIS — K59.00 CONSTIPATION, UNSPECIFIED CONSTIPATION TYPE: ICD-10-CM

## 2019-06-18 LAB
BILIRUB SERPL-MCNC: NEGATIVE MG/DL
BLOOD URINE, POC: NORMAL
COLOR, POC UA: NORMAL
GLUCOSE UR QL STRIP: NORMAL
KETONES UR QL STRIP: NEGATIVE
LEUKOCYTE ESTERASE URINE, POC: NEGATIVE
NITRITE, POC UA: NEGATIVE
PH, POC UA: 6
PROTEIN, POC: NORMAL
SPECIFIC GRAVITY, POC UA: 1.02
UROBILINOGEN, POC UA: NORMAL

## 2019-06-18 PROCEDURE — 74019 XR ABDOMEN FLAT AND ERECT: ICD-10-PCS | Mod: 26,,, | Performed by: RADIOLOGY

## 2019-06-18 PROCEDURE — 99215 OFFICE O/P EST HI 40 MIN: CPT | Mod: 25,S$GLB,, | Performed by: FAMILY MEDICINE

## 2019-06-18 PROCEDURE — 99999 PR PBB SHADOW E&M-EST. PATIENT-LVL IV: ICD-10-PCS | Mod: PBBFAC,,, | Performed by: FAMILY MEDICINE

## 2019-06-18 PROCEDURE — 74019 RADEX ABDOMEN 2 VIEWS: CPT | Mod: 26,,, | Performed by: RADIOLOGY

## 2019-06-18 PROCEDURE — 99999 PR PBB SHADOW E&M-EST. PATIENT-LVL IV: CPT | Mod: PBBFAC,,, | Performed by: FAMILY MEDICINE

## 2019-06-18 PROCEDURE — 99215 PR OFFICE/OUTPT VISIT, EST, LEVL V, 40-54 MIN: ICD-10-PCS | Mod: 25,S$GLB,, | Performed by: FAMILY MEDICINE

## 2019-06-18 PROCEDURE — 81002 POCT URINE DIPSTICK WITHOUT MICROSCOPE: ICD-10-PCS | Mod: S$GLB,,, | Performed by: FAMILY MEDICINE

## 2019-06-18 PROCEDURE — 81002 URINALYSIS NONAUTO W/O SCOPE: CPT | Mod: S$GLB,,, | Performed by: FAMILY MEDICINE

## 2019-06-18 PROCEDURE — 74019 RADEX ABDOMEN 2 VIEWS: CPT | Mod: TC,FY,PO

## 2019-06-18 PROCEDURE — 87086 URINE CULTURE/COLONY COUNT: CPT

## 2019-06-18 RX ORDER — PROMETHAZINE HYDROCHLORIDE 12.5 MG/1
TABLET ORAL
Refills: 0 | COMMUNITY
Start: 2019-06-15 | End: 2019-06-18

## 2019-06-18 RX ORDER — KETOROLAC TROMETHAMINE 10 MG/1
10 TABLET, FILM COATED ORAL 3 TIMES DAILY
Refills: 0 | COMMUNITY
Start: 2019-06-15 | End: 2020-08-13

## 2019-06-18 RX ORDER — CIPROFLOXACIN 500 MG/1
500 TABLET ORAL 2 TIMES DAILY
Refills: 0 | COMMUNITY
Start: 2019-06-15 | End: 2020-08-13

## 2019-06-19 ENCOUNTER — PATIENT MESSAGE (OUTPATIENT)
Dept: INTERNAL MEDICINE | Facility: CLINIC | Age: 49
End: 2019-06-19

## 2019-06-19 ENCOUNTER — TELEPHONE (OUTPATIENT)
Dept: INTERNAL MEDICINE | Facility: CLINIC | Age: 49
End: 2019-06-19

## 2019-06-19 NOTE — TELEPHONE ENCOUNTER
Patient called about results and I advised her of Dr Sally song that were also sent to her via [facility] and she verbalized that she read Dr.Love song but she was concerned becouse it said moderate after the explanation patient expressed [email or web address]

## 2019-06-20 LAB — BACTERIA UR CULT: NORMAL

## 2019-06-24 NOTE — PROGRESS NOTES
Subjective:      Patient ID: Shane Mckeon is a 49 y.o. female.    Chief Complaint: Hospital Follow Up    Disclaimer:  This note is prepared using voice recognition software and as such is likely to have errors and has not been proof read. Please contact me for questions.     Patient is coming in today for follow-up of her recent emergency room visit.  Over the weekend she went to [facility] emergency room due to sudden onset of right abdominal pain. Occurred on Friday in the that was around mild but then stop.  Saturday at 2:00 a.m. however it got so severe and sudden that she went on in.  At 6:00 a.m..  While she was there she was given Toradol injection as well as started on IV antibiotics of Zosyn in pain medicines of Dilaudid.  She reports that the emergency room physician was concerned about the potential for either a kidney stone or possible appendicitis.  Blood work came back did not show any elevation of white count she was afebrile.  She did have some nausea but no vomiting and constipation.  No dysuria or frequency.  Electrolytes were normal.  Liver function tests were normal.  CV she showed white count 7.5 hemoglobin 11.9 platelet count 189.  Urinalysis showed trace protein moderate blood negative for glucose negative for nitrates.  TSH was ordered which was normal.  Magnesium level was normal at 2 CPK was normal at 65 cardiac enzymes were normal with troponin and CK-MB and myoglobin.  BNP was ordered at 10.3 which was normal PT INR was normal.  Patient also had a CT scan of the abdomen and pelvis without contrast due to flank pain. Reports that there was no renal stone identified no hydronephrosis there is mild right renal pelviectasis and periureteral fat streaking sees adjusting inflammatory process minimal periappendiceal fat streaking per the report from the emergency room.  Patient was admitted for short-term observation.  Received the IV antibiotics.  Based on the progress notes from the  emergency room it states that her condition improved with IV fluids Dilaudid and Toradol she was no longer having right lower quadrant pain.  They suspected that it might be a kidney stone as a possible white passed kidney stone however the CT scan was unrevealing for the stone.  They thought that there was possibly an inflammatory process at the right proximal ureter in some residual hematuria.  They gave her some tablets of Toradol and promethazine for nausea.  They questioned appendicitis for the CT scan but they felt was unlikely due to her white blood cell count being normal and no rebound tenderness.  They went ahead and covered her with Zosyn and send her home on Cipro for 500 mg twice a day for 10 days for possible UTI.  Patient is coming in today because she reports that she still once again over the weekend had recurring right lower quadrant and flank pain. Of note in the CT scan the bladder was noted to have some fluid in it but no stone seen the uterus was absent.  According to the report there was no obstructing lesions identified in the kidneys.  Liver gallbladder were normal pancreas is normal spleen was normal adrenal glands were normal.  There is no aneurysm noted of the aorta no enlarged lymph nodes.  They did report mild sclerosis about the iliac aspect the left SI joint in the middle 3rd.  She reports that she still gets this discomfort off and on she is really uncertain where to go.  They had recommended that she follow up with the urologist however at this time there is no noted documentation of a kidney stone at this time.  Thus Jeffrey discussion with the patient was that we could possibly do a KUB today in the office to appear in see if there is a calcification in the bladder since she is having recurring pain off and on.  We can also obtain another urinalysis as well as urine culture to evaluate for possible UTI however she has already received adequate treatment for UTI.  We can give her  another Toradol injection if necessary.  She is not running fever at this time.  I do not see where referral to Urology is necessary at this time.      Lab Results   Component Value Date    WBC 5.86 04/23/2019    HGB 13.0 04/23/2019    HCT 41.8 04/23/2019     04/23/2019    CHOL 215 (H) 04/23/2019    TRIG 103 04/23/2019    HDL 51 04/23/2019    ALT 40 04/23/2019    AST 23 04/23/2019     04/23/2019    K 5.4 (H) 04/23/2019     04/23/2019    CREATININE 0.7 04/23/2019    BUN 13 04/23/2019    CO2 27 04/23/2019    TSH 1.584 04/23/2019       X-Ray Abdomen Flat And Erect  Narrative: EXAMINATION:  XR ABDOMEN FLAT AND ERECT    CLINICAL HISTORY:  Right lower quadrant pain    TECHNIQUE:  Flat and erect AP views of the abdomen were performed.    COMPARISON:  None    FINDINGS:  Moderate air and feces within the colon and rectum.  No abnormally distended air-filled small bowel loop or free air on this exam.  Please note diaphragms excluded from the erect view.  No abnormal calcification or radiopaque foreign body in the abdomen or pelvis.  There is minimal spondylosis and underlying rotary scoliosis at the thoracolumbar junction region.  Mild degenerative change bilateral hips.  Impression: Nonobstructive bowel pattern.  Clinical and laboratory findings should guide follow-up and/or further evaluation.    Additional findings as detailed above.    Electronically signed by: Héctor Lara MD  Date:    06/18/2019  Time:    11:48        Review of Systems   Constitutional: Positive for activity change, appetite change and fatigue. Negative for chills, fever and unexpected weight change.   Gastrointestinal: Positive for abdominal pain and constipation. Negative for abdominal distention, diarrhea and nausea.   Genitourinary: Positive for flank pain. Negative for difficulty urinating, frequency, hematuria, vaginal bleeding, vaginal discharge and vaginal pain.   Musculoskeletal: Positive for myalgias.  "  Psychiatric/Behavioral: Positive for sleep disturbance.     Objective:     Vitals:    06/18/19 1004   BP: 120/80   Pulse: 74   Temp: 98 °F (36.7 °C)   Weight: 68.5 kg (151 lb 0.2 oz)   Height: 5' 4" (1.626 m)     Physical Exam   Constitutional: She is oriented to person, place, and time. She appears well-developed and well-nourished.   HENT:   Head: Normocephalic and atraumatic.   Right Ear: External ear normal.   Left Ear: External ear normal.   Mouth/Throat: Oropharynx is clear and moist.   Eyes: EOM are normal.   Neck: Normal range of motion. Neck supple. No thyromegaly present.   Cardiovascular: Normal rate and regular rhythm. Exam reveals no gallop and no friction rub.   No murmur heard.  Pulmonary/Chest: Effort normal. No respiratory distress. She has no wheezes. She has no rales.   Abdominal: Soft. Normal appearance and bowel sounds are normal. She exhibits no distension. There is no tenderness. There is CVA tenderness. There is no rebound.   Musculoskeletal: Normal range of motion. She exhibits no edema.   Lymphadenopathy:     She has no cervical adenopathy.   Neurological: She is alert and oriented to person, place, and time.   Skin: Skin is warm and dry. No rash noted.   Psychiatric: She has a normal mood and affect. Her behavior is normal. Judgment and thought content normal.   Vitals reviewed.    Assessment:     1. RLQ abdominal pain    2. Hematuria, unspecified type    3. Constipation, unspecified constipation type      Plan:   Shane was seen today for hospital follow up.    Diagnoses and all orders for this visit:    RLQ abdominal pain-at this time extensively reviewed the emergency room workup with the patient.  We will obtain x-ray today.  X-ray today shows significant evidence of moderate stool present but no abscess collection.  No evidence of kidney stone.  At this time since the patient has not had a large bowel movement we did discuss potentially about just doing a bowel cleanout.  She is " willing to do so.  Advised her to get a mat bottle magnesium citrate.  We will also obtain urinalysis and urine culture.  Based on the results of this when go forward.  She can continue with the Toradol.  She may continue 1 more day of the Cipro but then I would advise her to stop this time.  She has been adequately covered for a UTI if at all noted from the emergency room antibiotics.  She can follow-up based on these results.  She is possibly interested in seeing the urologist will place this referral as well mainly to the findings noted on the CT scan.  And the fact that hematuria still present.  She may need to cystoscope.  -     POCT URINE DIPSTICK WITHOUT MICROSCOPE  -     Urine culture; Future  -     X-Ray Abdomen Flat And Erect; Future  -     Ambulatory referral to Urology  -     Urine culture    Hematuria, unspecified type on point of care testing as well as urinalysis done in the emergency room no evidence of direct kidney stone at this time.  Refer to urologist.  -     POCT URINE DIPSTICK WITHOUT MICROSCOPE  -     Urine culture; Future  -     X-Ray Abdomen Flat And Erect; Future  -     Ambulatory referral to Urology  -     Urine culture    Time spent: 40 minutes in face to face discussion concerning diagnosis, prognosis, review of lab and test results, benefits of treatment as well as management of disease, counseling of patient and coordination of care between various health care providers . Greater than half the time spent was used for coordination of care and counseling of patient.           Follow up if symptoms worsen or fail to improve.    Patient Instructions   Bottle of magnesium citrate to clean out the bowels tonight.

## 2019-06-26 ENCOUNTER — OFFICE VISIT (OUTPATIENT)
Dept: UROLOGY | Facility: CLINIC | Age: 49
End: 2019-06-26
Payer: COMMERCIAL

## 2019-06-26 VITALS
HEIGHT: 64 IN | WEIGHT: 147.25 LBS | HEART RATE: 83 BPM | SYSTOLIC BLOOD PRESSURE: 126 MMHG | BODY MASS INDEX: 25.14 KG/M2 | DIASTOLIC BLOOD PRESSURE: 84 MMHG

## 2019-06-26 DIAGNOSIS — R10.9 FLANK PAIN: Primary | ICD-10-CM

## 2019-06-26 LAB
BACTERIA #/AREA URNS AUTO: ABNORMAL /HPF
BILIRUB SERPL-MCNC: NORMAL MG/DL
BLOOD URINE, POC: NORMAL
COLOR, POC UA: YELLOW
GLUCOSE UR QL STRIP: NORMAL
HYALINE CASTS UR QL AUTO: 8 /LPF
KETONES UR QL STRIP: NORMAL
LEUKOCYTE ESTERASE URINE, POC: NORMAL
MICROSCOPIC COMMENT: ABNORMAL
NITRITE, POC UA: NORMAL
PH, POC UA: 5
POC RESIDUAL URINE VOLUME: 21 ML (ref 0–100)
PROTEIN, POC: NORMAL
RBC #/AREA URNS AUTO: 2 /HPF (ref 0–4)
SPECIFIC GRAVITY, POC UA: 1.02
SQUAMOUS #/AREA URNS AUTO: 1 /HPF
URATE CRY UR QL COMP ASSIST: ABNORMAL
UROBILINOGEN, POC UA: NORMAL
WBC #/AREA URNS AUTO: 5 /HPF (ref 0–5)

## 2019-06-26 PROCEDURE — 99999 PR PBB SHADOW E&M-EST. PATIENT-LVL III: CPT | Mod: PBBFAC,,, | Performed by: UROLOGY

## 2019-06-26 PROCEDURE — 81002 POCT URINE DIPSTICK WITHOUT MICROSCOPE: ICD-10-PCS | Mod: S$GLB,,, | Performed by: UROLOGY

## 2019-06-26 PROCEDURE — 99244 PR OFFICE CONSULTATION,LEVEL IV: ICD-10-PCS | Mod: 25,S$GLB,, | Performed by: UROLOGY

## 2019-06-26 PROCEDURE — 99244 OFF/OP CNSLTJ NEW/EST MOD 40: CPT | Mod: 25,S$GLB,, | Performed by: UROLOGY

## 2019-06-26 PROCEDURE — 99999 PR PBB SHADOW E&M-EST. PATIENT-LVL III: ICD-10-PCS | Mod: PBBFAC,,, | Performed by: UROLOGY

## 2019-06-26 PROCEDURE — 51798 POCT BLADDER SCAN: ICD-10-PCS | Mod: S$GLB,,, | Performed by: UROLOGY

## 2019-06-26 PROCEDURE — 51798 US URINE CAPACITY MEASURE: CPT | Mod: S$GLB,,, | Performed by: UROLOGY

## 2019-06-26 PROCEDURE — 87086 URINE CULTURE/COLONY COUNT: CPT

## 2019-06-26 PROCEDURE — 81002 URINALYSIS NONAUTO W/O SCOPE: CPT | Mod: S$GLB,,, | Performed by: UROLOGY

## 2019-06-26 PROCEDURE — 81001 URINALYSIS AUTO W/SCOPE: CPT

## 2019-06-26 NOTE — LETTER
June 26, 2019      Nika Zavala MD  [address]  [address]  [place] [address]           [facility]  [id number] [place] [address] Drive  [place] [address]  Phone: [phone]  Fax: [phone]          Patient: Shane Mckeon   MR Number: [id number]   YOB: 1970   Date of Visit: 6/26/2019       Dear Dr. Nika Zavala:    Thank you for referring Shane Mckeon to me for evaluation. Attached you will find relevant portions of my assessment and plan of care.    If you have questions, please do not hesitate to call me. I look forward to following Shane Mckeon along with you.    Sincerely,    Yuan Patel IV, MD    Enclosure  CC:  No Recipients    If you would like to receive this communication electronically, please contact [email or web address] or [phone] to request more information on [employer] access.    For providers and/or their staff who would like to refer a patient to [facility], please contact us through our one-stop-shop provider referral line, [facility] , at [phone].    If you feel you have received this communication in error or would no longer like to receive these types of communications, please e-mail [email or web address]

## 2019-06-26 NOTE — PROGRESS NOTES
Chief Complaint: Hematuria?    HPI:   6/26/19: 48 yo woman referred by Dr. Zavala for hematuria and abdominal pain.  KUB normal.  Had an episode of severe right flank pain 10d ago that brought her to the ER where a CT showed no abnormalities of the  system.  Gets intermittent flank pain lately and the RLQ.  Toradol helped. No other abd/pelvic pain and no exac/rel factors.  No gross hematuria.  No urolithiasis.  No urinary bother.  No  history.  Normal sexual function.    Allergies:  Patient has no known allergies.    Medications: has a current medication list which includes the following prescription(s): fluticasone propionate, levocetirizine, multivitamin, vitamin d, albuterol, aspirin, ciprofloxacin hcl, and ketorolac.    Review of Systems:  General: No fever, chills, fatigability, or weight loss.  Skin: No rashes, itching, or changes in color or texture of skin.  Chest: Denies BOWMAN, cyanosis, wheezing, cough, and sputum production.  Abdomen: Appetite fine. No weight loss. Denies diarrhea, abdominal pain, hematemesis, or blood in stool.  Musculoskeletal: No joint stiffness or swelling. Denies back pain.  : As above.  All other review of systems negative.    PMH:   has a past medical history of Anxiety, Chest pain syndrome (11/5/2014), and Ovarian cyst.    PSH:   has a past surgical history that includes Endometrial ablation; Hysterectomy; and Knee surgery.    FamHx: family history includes Diabetes in her mother; Heart attack (age of onset: 55) in her mother; Heart attack (age of onset: 60) in her father; Heart failure in her father and mother; Hyperlipidemia in her father and mother; Hypertension in her father and mother.    SocHx:  reports that she has never smoked. She has never used smokeless tobacco. She reports that she drinks alcohol. She reports that she does not use drugs.     Physical Exam:  Vitals:   Vitals:    06/26/19 0704   BP: 126/84   Pulse: 83     General: A&Ox3. No apparent distress. No  deformities.  Neck: No masses. Normal thyroid.  Lungs: normal inspiration. No use of accessory muscles.  Heart: normal pulse. No arrhythmias.  Abdomen: Soft. NT. ND. No masses. No hernias. No hepatosplenomegaly.  Lymphatic: Neck and groin nodes negative.  Skin: The skin is warm and dry. No jaundice.  Ext: No c/c/e.  : External genitalia normal.     Labs/Studies:   Urinalysis performed in clinic, summary: UA normal    Impression/Plan:   1. Cath UA/UCx.  Overall reassured CT shows no worrisome findings and microhematuria not present today.  Discussed possible theories.

## 2019-06-26 NOTE — PROGRESS NOTES
..Using sterile technique, pt was catheterized per orders of Dr. Patel.  Urine collected and sent to lab.  Pt tolerated procedure well.

## 2019-06-27 LAB — BACTERIA UR CULT: NO GROWTH

## 2019-08-16 ENCOUNTER — PATIENT MESSAGE (OUTPATIENT)
Dept: INTERNAL MEDICINE | Facility: CLINIC | Age: 49
End: 2019-08-16

## 2019-12-17 ENCOUNTER — PATIENT MESSAGE (OUTPATIENT)
Dept: INTERNAL MEDICINE | Facility: CLINIC | Age: 49
End: 2019-12-17

## 2019-12-30 ENCOUNTER — PATIENT MESSAGE (OUTPATIENT)
Dept: INTERNAL MEDICINE | Facility: CLINIC | Age: 49
End: 2019-12-30

## 2020-05-13 ENCOUNTER — PATIENT MESSAGE (OUTPATIENT)
Dept: INTERNAL MEDICINE | Facility: CLINIC | Age: 50
End: 2020-05-13

## 2020-05-13 RX ORDER — LEVOCETIRIZINE DIHYDROCHLORIDE 5 MG/1
5 TABLET, FILM COATED ORAL NIGHTLY
Qty: 30 TABLET | Refills: 0 | Status: SHIPPED | OUTPATIENT
Start: 2020-05-13 | End: 2020-06-08

## 2020-06-08 RX ORDER — LEVOCETIRIZINE DIHYDROCHLORIDE 5 MG/1
TABLET, FILM COATED ORAL
Qty: 30 TABLET | Refills: 0 | Status: SHIPPED | OUTPATIENT
Start: 2020-06-08 | End: 2020-07-08

## 2020-08-13 ENCOUNTER — OFFICE VISIT (OUTPATIENT)
Dept: PRIMARY CARE CLINIC | Facility: CLINIC | Age: 50
End: 2020-08-13
Payer: COMMERCIAL

## 2020-08-13 ENCOUNTER — LAB VISIT (OUTPATIENT)
Dept: LAB | Facility: HOSPITAL | Age: 50
End: 2020-08-13
Attending: FAMILY MEDICINE
Payer: COMMERCIAL

## 2020-08-13 VITALS
SYSTOLIC BLOOD PRESSURE: 120 MMHG | HEART RATE: 85 BPM | BODY MASS INDEX: 27.45 KG/M2 | DIASTOLIC BLOOD PRESSURE: 80 MMHG | WEIGHT: 159.94 LBS | OXYGEN SATURATION: 97 % | TEMPERATURE: 99 F

## 2020-08-13 DIAGNOSIS — F41.1 GAD (GENERALIZED ANXIETY DISORDER): ICD-10-CM

## 2020-08-13 DIAGNOSIS — R14.3 FLATULENCE: ICD-10-CM

## 2020-08-13 DIAGNOSIS — Z00.00 ROUTINE GENERAL MEDICAL EXAMINATION AT A HEALTH CARE FACILITY: Primary | ICD-10-CM

## 2020-08-13 DIAGNOSIS — J30.1 CHRONIC ALLERGIC RHINITIS DUE TO POLLEN: ICD-10-CM

## 2020-08-13 DIAGNOSIS — Z00.00 ROUTINE GENERAL MEDICAL EXAMINATION AT A HEALTH CARE FACILITY: ICD-10-CM

## 2020-08-13 LAB
ALBUMIN SERPL BCP-MCNC: 4 G/DL (ref 3.5–5.2)
ALP SERPL-CCNC: 83 U/L (ref 55–135)
ALT SERPL W/O P-5'-P-CCNC: 21 U/L (ref 10–44)
ANION GAP SERPL CALC-SCNC: 10 MMOL/L (ref 8–16)
AST SERPL-CCNC: 24 U/L (ref 10–40)
BASOPHILS # BLD AUTO: 0.03 K/UL (ref 0–0.2)
BASOPHILS NFR BLD: 0.4 % (ref 0–1.9)
BILIRUB SERPL-MCNC: 0.3 MG/DL (ref 0.1–1)
BUN SERPL-MCNC: 8 MG/DL (ref 6–20)
CALCIUM SERPL-MCNC: 9.1 MG/DL (ref 8.7–10.5)
CHLORIDE SERPL-SCNC: 105 MMOL/L (ref 95–110)
CHOLEST SERPL-MCNC: 210 MG/DL (ref 120–199)
CHOLEST/HDLC SERPL: 3.5 {RATIO} (ref 2–5)
CO2 SERPL-SCNC: 25 MMOL/L (ref 23–29)
CREAT SERPL-MCNC: 0.7 MG/DL (ref 0.5–1.4)
DIFFERENTIAL METHOD: ABNORMAL
EOSINOPHIL # BLD AUTO: 0.1 K/UL (ref 0–0.5)
EOSINOPHIL NFR BLD: 1 % (ref 0–8)
ERYTHROCYTE [DISTWIDTH] IN BLOOD BY AUTOMATED COUNT: 11.9 % (ref 11.5–14.5)
EST. GFR  (AFRICAN AMERICAN): >60 ML/MIN/1.73 M^2
EST. GFR  (NON AFRICAN AMERICAN): >60 ML/MIN/1.73 M^2
GLUCOSE SERPL-MCNC: 84 MG/DL (ref 70–110)
HCT VFR BLD AUTO: 39.3 % (ref 37–48.5)
HDLC SERPL-MCNC: 60 MG/DL (ref 40–75)
HDLC SERPL: 28.6 % (ref 20–50)
HGB BLD-MCNC: 12.1 G/DL (ref 12–16)
IMM GRANULOCYTES # BLD AUTO: 0.02 K/UL (ref 0–0.04)
IMM GRANULOCYTES NFR BLD AUTO: 0.3 % (ref 0–0.5)
LDLC SERPL CALC-MCNC: 104.6 MG/DL (ref 63–159)
LYMPHOCYTES # BLD AUTO: 1.6 K/UL (ref 1–4.8)
LYMPHOCYTES NFR BLD: 23.3 % (ref 18–48)
MCH RBC QN AUTO: 31.5 PG (ref 27–31)
MCHC RBC AUTO-ENTMCNC: 30.8 G/DL (ref 32–36)
MCV RBC AUTO: 102 FL (ref 82–98)
MONOCYTES # BLD AUTO: 0.4 K/UL (ref 0.3–1)
MONOCYTES NFR BLD: 6.3 % (ref 4–15)
NEUTROPHILS # BLD AUTO: 4.7 K/UL (ref 1.8–7.7)
NEUTROPHILS NFR BLD: 68.7 % (ref 38–73)
NONHDLC SERPL-MCNC: 150 MG/DL
NRBC BLD-RTO: 0 /100 WBC
PLATELET # BLD AUTO: 222 K/UL (ref 150–350)
PMV BLD AUTO: 12.6 FL (ref 9.2–12.9)
POTASSIUM SERPL-SCNC: 4.2 MMOL/L (ref 3.5–5.1)
PROT SERPL-MCNC: 7.7 G/DL (ref 6–8.4)
RBC # BLD AUTO: 3.84 M/UL (ref 4–5.4)
SODIUM SERPL-SCNC: 140 MMOL/L (ref 136–145)
TRIGL SERPL-MCNC: 227 MG/DL (ref 30–150)
TSH SERPL DL<=0.005 MIU/L-ACNC: 2.04 UIU/ML (ref 0.4–4)
WBC # BLD AUTO: 6.79 K/UL (ref 3.9–12.7)

## 2020-08-13 PROCEDURE — 99396 PREV VISIT EST AGE 40-64: CPT | Mod: S$GLB,,, | Performed by: FAMILY MEDICINE

## 2020-08-13 PROCEDURE — 82306 VITAMIN D 25 HYDROXY: CPT

## 2020-08-13 PROCEDURE — 99999 PR PBB SHADOW E&M-EST. PATIENT-LVL III: ICD-10-PCS | Mod: PBBFAC,,, | Performed by: FAMILY MEDICINE

## 2020-08-13 PROCEDURE — 36415 COLL VENOUS BLD VENIPUNCTURE: CPT | Mod: PN

## 2020-08-13 PROCEDURE — 80061 LIPID PANEL: CPT

## 2020-08-13 PROCEDURE — 80053 COMPREHEN METABOLIC PANEL: CPT

## 2020-08-13 PROCEDURE — 85025 COMPLETE CBC W/AUTO DIFF WBC: CPT

## 2020-08-13 PROCEDURE — 99396 PR PREVENTIVE VISIT,EST,40-64: ICD-10-PCS | Mod: S$GLB,,, | Performed by: FAMILY MEDICINE

## 2020-08-13 PROCEDURE — 84443 ASSAY THYROID STIM HORMONE: CPT

## 2020-08-13 PROCEDURE — 99999 PR PBB SHADOW E&M-EST. PATIENT-LVL III: CPT | Mod: PBBFAC,,, | Performed by: FAMILY MEDICINE

## 2020-08-13 RX ORDER — FLUTICASONE PROPIONATE 50 MCG
1 SPRAY, SUSPENSION (ML) NASAL DAILY
Qty: 16 G | Refills: 11 | Status: SHIPPED | OUTPATIENT
Start: 2020-08-13 | End: 2021-10-25

## 2020-08-13 RX ORDER — ESTRADIOL 1 MG/1
TABLET ORAL
COMMUNITY
Start: 2020-08-11 | End: 2021-10-25

## 2020-08-13 RX ORDER — LEVOCETIRIZINE DIHYDROCHLORIDE 5 MG/1
5 TABLET, FILM COATED ORAL NIGHTLY
Qty: 30 TABLET | Refills: 11 | Status: SHIPPED | OUTPATIENT
Start: 2020-08-13 | End: 2021-10-25

## 2020-08-13 RX ORDER — ALPRAZOLAM 0.25 MG/1
0.25 TABLET ORAL DAILY PRN
Qty: 20 TABLET | Refills: 0 | Status: SHIPPED | OUTPATIENT
Start: 2020-08-13 | End: 2021-03-10

## 2020-08-13 NOTE — PROGRESS NOTES
Subjective:      Patient ID: Shane Mckeon is a 50 y.o. female.    Chief Complaint: Annual Exam    Disclaimer:  This note is prepared using voice recognition software and as such is likely to have errors and has not been proof read. Please contact me for questions.     Patient is a 50-year-old coming in today for prevention exam.  She is doing well.  She reports some GI discomfort with loose bowels after eating certain foods, especially spicy foods.  She also notices it when she drinks Dr. Pepper.  She's never noticed any blood in her stool.  She reports regular bowel movements.  Did have a hospitalization with emergency room at [facility] which they suspected she might have a kidney stone at that time.  Then she ended up seeing GI.  Did have a lot of stool issues at that time.  Reports mainly a lot more distention and gas.  Reports she drinks a Dr. Pepper every day.  Drinks out of straws and she has gone.  Did discuss that elimination of these things may help with her gas and distention.    She is out of her allergy medicines.  Normally is on Flonase and Xyzal. .  Does normally see Gynecology and did a mammo last year through Dr. Margot donaldson.  No abnormalities noted.      Is needing to lipid profile nonfasting. Is doing some over-the-counter multivitamins and vitamin-D occasionally (not every day)     Also reports that she has been using a friend's Xanax lately just to help with stressful levels at work.  Reports she is going to be using them for the next 2 weeks at least as there has been a lot changes at work.  Seems to be helpful for her.  Does not feel like she needs something every day though.          Lab Results   Component Value Date    WBC 5.86 04/23/2019    HGB 13.0 04/23/2019    HCT 41.8 04/23/2019     04/23/2019    CHOL 215 (H) 04/23/2019    TRIG 103 04/23/2019    HDL 51 04/23/2019    ALT 40 04/23/2019    AST 23 04/23/2019     04/23/2019    K 5.4 (H) 04/23/2019      04/23/2019    CREATININE 0.7 04/23/2019    BUN 13 04/23/2019    CO2 27 04/23/2019    TSH 1.584 04/23/2019       X-Ray Abdomen Flat And Erect  Narrative: EXAMINATION:  XR ABDOMEN FLAT AND ERECT    CLINICAL HISTORY:  Right lower quadrant pain    TECHNIQUE:  Flat and erect AP views of the abdomen were performed.    COMPARISON:  None    FINDINGS:  Moderate air and feces within the colon and rectum.  No abnormally distended air-filled small bowel loop or free air on this exam.  Please note diaphragms excluded from the erect view.  No abnormal calcification or radiopaque foreign body in the abdomen or pelvis.  There is minimal spondylosis and underlying rotary scoliosis at the thoracolumbar junction region.  Mild degenerative change bilateral hips.  Impression: Nonobstructive bowel pattern.  Clinical and laboratory findings should guide follow-up and/or further evaluation.    Additional findings as detailed above.    Electronically signed by: Héctor Lara MD  Date:    06/18/2019  Time:    11:48        Review of Systems   Constitutional: Negative for activity change, appetite change, chills, fatigue and fever.   HENT: Positive for congestion, postnasal drip, rhinorrhea, sinus pressure and sinus pain. Negative for ear pain and trouble swallowing.    Eyes: Negative for pain and visual disturbance.   Respiratory: Negative for cough and shortness of breath.    Cardiovascular: Negative for chest pain and leg swelling.   Gastrointestinal: Positive for abdominal distention. Negative for abdominal pain, blood in stool, constipation, diarrhea, nausea and vomiting.   Endocrine: Negative for cold intolerance and heat intolerance.   Genitourinary: Negative for dysuria and frequency.   Musculoskeletal: Negative for joint swelling, myalgias and neck pain.   Skin: Negative for color change and rash.   Neurological: Negative for dizziness and headaches.   Psychiatric/Behavioral: Positive for dysphoric mood. Negative for behavioral  problems and sleep disturbance. The patient is nervous/anxious.      Objective:     Vitals:    08/13/20 1059   BP: 120/80   Pulse: 85   Temp: 98.6 °F (37 °C)   SpO2: 97%   Weight: 72.6 kg (159 lb 15.1 oz)     Physical Exam  Vitals signs reviewed.   Constitutional:       Appearance: Normal appearance. She is well-developed and normal weight.   HENT:      Head: Normocephalic and atraumatic.      Right Ear: Tympanic membrane and external ear normal.      Left Ear: Tympanic membrane and external ear normal.      Nose: Mucosal edema and rhinorrhea present.      Mouth/Throat:      Mouth: Mucous membranes are moist.      Pharynx: Oropharynx is clear. Posterior oropharyngeal erythema present.   Eyes:      Conjunctiva/sclera: Conjunctivae normal.      Pupils: Pupils are equal, round, and reactive to light.   Neck:      Musculoskeletal: Normal range of motion and neck supple.      Thyroid: No thyromegaly.   Cardiovascular:      Rate and Rhythm: Normal rate and regular rhythm.      Heart sounds: No murmur. No friction rub. No gallop.    Pulmonary:      Effort: Pulmonary effort is normal. No respiratory distress.      Breath sounds: Normal breath sounds. No wheezing or rales.   Abdominal:      General: Bowel sounds are normal. There is no distension.      Palpations: Abdomen is soft.      Tenderness: There is no abdominal tenderness. There is no rebound.   Musculoskeletal: Normal range of motion.   Lymphadenopathy:      Cervical: No cervical adenopathy.   Skin:     General: Skin is warm and dry.      Findings: No rash.   Neurological:      General: No focal deficit present.      Mental Status: She is alert and oriented to person, place, and time. Mental status is at baseline.   Psychiatric:         Attention and Perception: Attention and perception normal.         Mood and Affect: Mood and affect normal.         Speech: Speech normal.         Behavior: Behavior normal.         Thought Content: Thought content normal.          Cognition and Memory: Cognition and memory normal.         Judgment: Judgment normal.       Assessment:     1. Routine general medical examination at a health care facility    2. Chronic allergic rhinitis due to pollen    3. AUDIE (generalized anxiety disorder)    4. Flatulence      Plan:   Shane was seen today for annual exam.    Diagnoses and all orders for this visit:    Routine general medical examination at a health care facility labs ordered today discussed health maintenance issues obtain copies of mammogram and Pap smear from Dr. Boykin office  -     TSH; Future  -     Lipid Panel; Future  -     Comprehensive metabolic panel; Future  -     CBC auto differential; Future  -     Vitamin D; Future    Chronic allergic rhinitis due to pollen-refilled medications today  -     levocetirizine (XYZAL) 5 MG tablet; Take 1 tablet (5 mg total) by mouth every evening.  -     fluticasone propionate (FLONASE) 50 mcg/actuation nasal spray; 1 spray (50 mcg total) by Each Nostril route once daily.    AUDIE (generalized anxiety disorder)-given short-term prescription for Xanax a continues to need then would recommend follow-up to discuss alternative options for more daily use.    Gas and bloating-eliminate carbonation a limits draws eliminate gum.  Follow up if not improving.  Push water.    Other orders  -     ALPRAZolam (XANAX) 0.25 MG tablet; Take 1 tablet (0.25 mg total) by mouth daily as needed for Anxiety.                  Follow up in about 1 year (around 8/13/2021) for physical with Dr BUSTAMANTE.    There are no Patient Instructions on file for this visit.

## 2020-08-14 LAB — 25(OH)D3+25(OH)D2 SERPL-MCNC: 26 NG/ML (ref 30–96)

## 2020-08-28 ENCOUNTER — PATIENT MESSAGE (OUTPATIENT)
Dept: PRIMARY CARE CLINIC | Facility: CLINIC | Age: 50
End: 2020-08-28

## 2020-09-11 DIAGNOSIS — Z12.39 BREAST CANCER SCREENING: ICD-10-CM

## 2020-10-27 ENCOUNTER — CLINICAL SUPPORT (OUTPATIENT)
Dept: INTERNAL MEDICINE | Facility: CLINIC | Age: 50
End: 2020-10-27
Payer: COMMERCIAL

## 2020-10-27 ENCOUNTER — OFFICE VISIT (OUTPATIENT)
Dept: PRIMARY CARE CLINIC | Facility: CLINIC | Age: 50
End: 2020-10-27
Payer: COMMERCIAL

## 2020-10-27 ENCOUNTER — PATIENT MESSAGE (OUTPATIENT)
Dept: PRIMARY CARE CLINIC | Facility: CLINIC | Age: 50
End: 2020-10-27

## 2020-10-27 ENCOUNTER — TELEPHONE (OUTPATIENT)
Dept: PRIMARY CARE CLINIC | Facility: CLINIC | Age: 50
End: 2020-10-27

## 2020-10-27 DIAGNOSIS — L29.9 ITCHING: ICD-10-CM

## 2020-10-27 DIAGNOSIS — R22.0 LIP SWELLING: Primary | ICD-10-CM

## 2020-10-27 DIAGNOSIS — R22.0 SWELLING OF LIP, TONGUE, AND THROAT: Primary | ICD-10-CM

## 2020-10-27 DIAGNOSIS — R22.1 SWELLING OF LIP, TONGUE, AND THROAT: Primary | ICD-10-CM

## 2020-10-27 PROCEDURE — 99999 PR PBB SHADOW E&M-EST. PATIENT-LVL II: ICD-10-PCS | Mod: PBBFAC,,,

## 2020-10-27 PROCEDURE — 99999 PR PBB SHADOW E&M-EST. PATIENT-LVL II: CPT | Mod: PBBFAC,,,

## 2020-10-27 PROCEDURE — 99214 PR OFFICE/OUTPT VISIT, EST, LEVL IV, 30-39 MIN: ICD-10-PCS | Mod: 95,,, | Performed by: FAMILY MEDICINE

## 2020-10-27 PROCEDURE — 96372 THER/PROPH/DIAG INJ SC/IM: CPT | Mod: S$GLB,,, | Performed by: FAMILY MEDICINE

## 2020-10-27 PROCEDURE — 96372 PR INJECTION,THERAP/PROPH/DIAG2ST, IM OR SUBCUT: ICD-10-PCS | Mod: S$GLB,,, | Performed by: FAMILY MEDICINE

## 2020-10-27 PROCEDURE — 99214 OFFICE O/P EST MOD 30 MIN: CPT | Mod: 95,,, | Performed by: FAMILY MEDICINE

## 2020-10-27 RX ORDER — LORATADINE 10 MG/1
10 TABLET ORAL DAILY
Qty: 30 TABLET | Refills: 0 | Status: SHIPPED | OUTPATIENT
Start: 2020-10-27 | End: 2021-03-10

## 2020-10-27 RX ORDER — BETAMETHASONE SODIUM PHOSPHATE AND BETAMETHASONE ACETATE 3; 3 MG/ML; MG/ML
12 INJECTION, SUSPENSION INTRA-ARTICULAR; INTRALESIONAL; INTRAMUSCULAR; SOFT TISSUE
Status: COMPLETED | OUTPATIENT
Start: 2020-10-27 | End: 2020-10-27

## 2020-10-27 RX ORDER — PREDNISONE 20 MG/1
TABLET ORAL
Qty: 20 TABLET | Refills: 0 | Status: SHIPPED | OUTPATIENT
Start: 2020-10-27 | End: 2021-03-10

## 2020-10-27 RX ORDER — FAMOTIDINE 20 MG/1
20 TABLET, FILM COATED ORAL 2 TIMES DAILY
Qty: 60 TABLET | Refills: 0 | Status: SHIPPED | OUTPATIENT
Start: 2020-10-27 | End: 2021-03-10

## 2020-10-27 RX ORDER — TRIAMCINOLONE ACETONIDE 1 MG/G
CREAM TOPICAL 4 TIMES DAILY
Qty: 80 G | Refills: 0 | Status: SHIPPED | OUTPATIENT
Start: 2020-10-27 | End: 2021-09-17

## 2020-10-27 RX ADMIN — BETAMETHASONE SODIUM PHOSPHATE AND BETAMETHASONE ACETATE 12 MG: 3; 3 INJECTION, SUSPENSION INTRA-ARTICULAR; INTRALESIONAL; INTRAMUSCULAR; SOFT TISSUE at 02:10

## 2020-10-27 NOTE — PATIENT INSTRUCTIONS
Angioedema  Angioedema (HC-[id number]-mu-eh-BIANCA-muh) is a sudden appearance of swollen patches (edema) on the skin or mucous membranes. It most often involves the face, lips, mouth, tongue, back of throat, or vocal cords. It may also occur in other places, such as the arms or legs. A rash may also appear during the first 4 days of this illness.  There are different types of angioedema. Your symptoms will depend on what type of angioedema you have. Swelling and redness may be the main symptoms. Like allergic reactions, angioedema may include:  · Rash, hives, redness, welts, blisters  · Itching, burning, stinging, pain  · Dry, flaky, cracking, or scaly skin  · Swelling of the face, lips, tongue, or other parts of the body  More severe symptoms may include:  · Trouble swallowing, or feeling like your throat is closing  · Trouble breathing or wheezing  · Hoarse voice or trouble speaking  · Nausea, vomiting, diarrhea, or stomach cramps  · Feeling faint or lightheaded, rapid heart rate, or low blood pressure  Angioedema can be triggered by exposure to certain substances. Medical conditions involving the immune systems and certain infections may cause it. In rare cases, angioedema can be hereditary. Sometimes the cause may be very clear. However, it is often hard to find a cause. The most common causes include:  · Foods, such as shrimp, shellfish, peanuts, milk products, gluten, and eggs; also colorings, flavorings, and additives  · Insect bites or stings, from bees, mosquitos, fleas, or ticks  · Medicines, such as ACE inhibitors, penicillin, sulfa drugs, amoxicillin, aspirin, and ibuprofen  · Latex, which may be in gloves, clothes, toys, balloons, and some kinds of tape. People who are allergic to latex may have problems with foods such as bananas, avocados, kiwi, papaya, or chestnuts.  · Stress  · Heat, cold, or sunlight  The most common cause of angioedema is a reaction to a class of medicines called ACE inhibitors. These  are used to treat high blood pressure. ACE inhibitors include captopril, enalapril, and lisinopril. Angiodema can happen even after you have been taking the medicine for some time. Tell your doctor if you have angioedema symptoms and are taking any of these medicines. Angioedema may recur. It is important to watch for the earliest signs of this condition (see the list below). Contact your healthcare provider right away if swelling involves the face, mouth, or throat.  Home care  Rest quietly today. Avoid vigorous physical activity.  Medicines: The healthcare provider may prescribe medicines for itching, swelling, or pain. Follow the healthcare providers instructions when taking these medicines.  · Oral diphenhydramine is an antihistamine available without a prescription. Unless a prescription antihistamine was given, diphenhydramine may be used to reduce widespread itching. It may make you sleepy, so be careful using it when going to school, working, or driving. (Note: Do not use diphenhydramine if you have glaucoma or if you are a man who has trouble urinating due to an enlarged prostate.) Loratadine is an antihistamine that may cause less drowsiness.  · Do not use diphenhydramine cream on your skin. Some people can have an allergic reaction to this.  · Calamine lotion or oatmeal baths sometimes help with itching.  · You may use acetaminophen or ibuprofen for pain, unless another pain medicine was prescribed.  · If you were told that your angioedema was caused by a medicine you are taking, you must stop taking it. Ask your healthcare provider for a different one. In the future, advise medical staff that you are allergic to this medicine.  · If medicine was prescribed, such as steroids or antihistamines, be sure you understand what the medicine is and how to take it.   General care  · Make sure you do not scratch areas of the body that had a reaction. This will help prevent infection.   · Stay away from air  pollution, tobacco, and wood smoke. Also stay away from cold temperatures. These things can make allergy symptoms worse.  · Try to find out what cause your reaction. Make sure to remove the allergen. Future reactions may be worse.   · If you have a serious allergy, wear a medical alert bracelet that notes this allergy.  · If the healthcare provider prescribed an epinephrine auto injector kit, keep it with you at all times.   · Tell all care providers about your allergy. Ask them h ow to use any prescribed medicines.  · Keep a record of allergies and symptoms, and when they occurred. This will help your provider treat you over time.   Follow-up care  Follow up with your healthcare provider, or as advised. You may need to see an allergist. An allergist can help find the cause of an allergic reaction and give recommendations on how to prevent future reactions.  Call 911  Contact emergency services right away if any of these occur:  · Trouble breathing or swallowing, or wheezing  · Hoarse voice or trouble speaking, or drooling  · Chest pain or tightness  · Confusion, lightheadedness, or dizziness  · Extreme drowsiness or trouble awakening  · Fainting or loss of consciousness  · Rapid heart rate  · Vomiting blood, or large amounts of blood in stool  · Seizure  · Nausea, vomiting, diarrhea, abdominal pain, or stomach cramps  When to seek medical attention  Call your healthcare provider right away if any of the following occur:  · Symptoms don't go away  · Symptoms come back  · Symptoms get worse or new symptoms develop  · Hives feel uncomfortable  · Fever of 100.4°F (38°C), or as directed by your healthcare professional  Date Last Reviewed: 5/1/2017  © 6188-6872 [employer]. [address] [place] [address], [place] [address]. All rights reserved. This information is not intended as a substitute for professional medical care. Always follow your healthcare professional's instructions.        When Your Child Has Hives  (Urticaria) or Angioedema    Hives (also called urticaria) are raised, red, itchy bumps on the skin. The bumps come and go for a few days and then disappear completely. Although hives can be uncomfortable, they wont harm your child or leave scars. Sometimes your child may have severe swelling around the lips or eyes. This is a more serious skin reaction called angioedema. It can happen with hives or on its own.  What causes hives?  Hives often develop when cells in your childs skin release a chemical called histamine during an allergic reaction. The histamine produces swelling, redness, and itching. Here are some of the most common causes:  · Foods, such as peanuts, shellfish, tree nuts, eggs, and milk, and food additives, such as monosodium glutamate (MSG) and artificial colorings.  · Viral infections  · Prescription and over-the-counter medicines. These include antibiotics (penicillin), sulfa, anticonvulsant drugs, phenobarbital, aspirin, and ibuprofen.  · Extreme heat or cold:  ¨ Cold-induced hives. These hives are caused by exposure to cold air or water.  ¨ Solar hives. These hives are caused by exposure to sunlight or light bulb light.  · Emotional stress  · Dematographism. These hives are cause by scratching the skin, continual striking of the skin, or wearing tight-fitting clothes that rub the skin.  · Chronic urticaria. These are hives that keep coming back and with no known cause.  · Exercise-induced urticaria. These allergic symptoms are brought on by physical activity.  What do hives look like?  Hives are itchy bumps that can vary in color from pink to deep red. They come in different sizes and sometimes spread to form large patches of swollen skin. Hives can appear on one part of the body and disappear on another in a matter of hours. Each hive lasts less than a day, but new hives may keep forming for days or even weeks.  How are hives diagnosed?  Your childs healthcare provider can diagnose hives by  looking at your childs skin and taking a complete health history. He or she may also do skin tests. These look for foods or other substances that your child may be sensitive to. Blood tests may be done to rule out causes of hives not related to allergies. In most cases, the cause of hives is never found.  How are hives treated?  For mild symptoms:  · Give your child an oral over-the-counter antihistamine that has diphenhydramine. Talk about this with your child's healthcare provider  · To relieve itching and swelling, apply calamine lotion or cool compresses, or have your child soak in a cool bath. (Adding 2 cups of ground oatmeal to the tub may make your child more comfortable).  For more severe symptoms, your childs healthcare provider may prescribe:  · A prescription or over-the-counter oral antihistamine to block the chemical in the body that causes allergic reactions. Your child is likely to take it every 4 to 6 hours for several days. Some antihistamines may make your child drowsy. Some work faster than others. Ask your childs healthcare provider which antihistamine to use and the correct dose to give your child.  · An oral steroid to relieve severe swelling of the throat and airways. Its usually taken for 3 to 5 days.  · Epinephrine (adrenaline) to use in an emergency to stop a severe allergic reaction. If swelling affects your childs breathing, get emergency care RIGHT AWAY. Your child is likely to need an injection of epinephrine to stop the allergic response.  Angioedema  Angioedema is a type of allergic reaction that sometimes happens along with hives. It causes swelling deep in the skin, especially around the lips and eyes. Swelling can make it hard to breathe. If this happens, seek medical care right away.   Preventing hives  To help prevent hives, avoid any substances your child is sensitive to:  · If your child has food allergies: Read labels carefully, and use caution in restaurants.  · Tell  your childs healthcare provider, dentist, and pharmacist about any allergies your child has to medicines. Keep a list of alternate medicines handy.  Call 911 or emergency services right away  It is an emergency if your child has hives and any of the following:   · Wheezing, or trouble breathing or swallowing  · Swelling of the lips, tongue, or throat  · Dizziness  · Loss of consciousness   Date Last Reviewed: 12/1/2016  © 3643-0305 [employer]. [address] [place] [address], [place] [address]. All rights reserved. This information is not intended as a substitute for professional medical care. Always follow your healthcare professional's instructions.         -Hyperactive delirium- marked sundowning, improving on therapy  -decreased Zyprexa 2.5mg on 5/9   -c/w Cymbalta 20mg BID

## 2020-10-27 NOTE — PROGRESS NOTES
Celestone administered. Pt tolerated well. Advised pt to remain in lobby 15 minutes after injection. If no adverse/allergic reaction, may leave

## 2020-10-27 NOTE — PROGRESS NOTES
Subjective:      Patient ID: Shane Mckeon is a 50 y.o. female.    Chief Complaint: Oral Swelling    Disclaimer:  This note is prepared using voice recognition software and as such is likely to have errors and has not been proof read. Please contact me for questions.     The patient location is:home  The chief complaint leading to consultation is: followup / my chart request  Visit type: Virtual visit with synchronous audio and video  Total time spent with patient:1200pm- 1216 pm   Each patient to whom he or she provides medical services by telemedicine is:  (1) informed of the relationship between the physician and patient and the respective role of any other health care provider with respect to management of the patient; and (2) notified that he or she may decline to receive medical services by telemedicine and may withdraw from such care at any time.    Notes:  Asking today for virtual visit.  She was offered to sooner visit and offered to go to the urgent care but declined.  She reports that it was just initially with some lip swelling.  And some chapped lips.  Started Saturday with chapped lips and then started swelling from the inside of the mouth . Then it started to go down a little bit but then Sunday night got worse, and this am this am much worse. Not certain if tongue is swollen.  Tongue feels a little strange.  No new foods.  Ate shrimp this weekend and normally eats shrimp this weekend. No new chapsticks prior to it.  Did burn.  Happened a few months ago and lasted for 2 days and wasn't nearly this bad.  No new toothpaste.  She states she normally eats shrimp often and she had that Thursday to Friday night but it has never been a problem for her before.  She does not really check try out new foods either.  She did take some Benadryl last night and she took some a little bit this a.m..  She tried some chapstick this morning but it is more burning.  She does have a little bit of erythema on her neck  as well today at but she reports is mainly just hot in the office she is in.  She is noticing that her tongue seems to be swelling some a little bit and has had a difficult time trying to drink fluids.  No cosmetic procedures have been done.  No other new medications or vitamin supplements at this time.  She is not currently on an ACE-inhibitor or an ARB.  No prior issues or known allergies she is aware of.  Has not seen an allergist.  Is able to breathe okay without difficulty.          Lab Results   Component Value Date    WBC 6.79 08/13/2020    HGB 12.1 08/13/2020    HCT 39.3 08/13/2020     08/13/2020    CHOL 210 (H) 08/13/2020    TRIG 227 (H) 08/13/2020    HDL 60 08/13/2020    ALT 21 08/13/2020    AST 24 08/13/2020     08/13/2020    K 4.2 08/13/2020     08/13/2020    CREATININE 0.7 08/13/2020    BUN 8 08/13/2020    CO2 25 08/13/2020    TSH 2.039 08/13/2020       X-Ray Abdomen Flat And Erect  Narrative: EXAMINATION:  XR ABDOMEN FLAT AND ERECT    CLINICAL HISTORY:  Right lower quadrant pain    TECHNIQUE:  Flat and erect AP views of the abdomen were performed.    COMPARISON:  None    FINDINGS:  Moderate air and feces within the colon and rectum.  No abnormally distended air-filled small bowel loop or free air on this exam.  Please note diaphragms excluded from the erect view.  No abnormal calcification or radiopaque foreign body in the abdomen or pelvis.  There is minimal spondylosis and underlying rotary scoliosis at the thoracolumbar junction region.  Mild degenerative change bilateral hips.  Impression: Nonobstructive bowel pattern.  Clinical and laboratory findings should guide follow-up and/or further evaluation.    Additional findings as detailed above.    Electronically signed by: Héctor Lara MD  Date:    06/18/2019  Time:    11:48        Review of Systems   Constitutional: Negative for activity change.   HENT: Negative for hearing loss and trouble swallowing.         Lip swelling tongue  swelling   Eyes: Negative for discharge.   Respiratory: Negative for chest tightness and wheezing.    Cardiovascular: Negative for chest pain and palpitations.   Gastrointestinal: Negative for constipation, diarrhea and vomiting.   Genitourinary: Negative for difficulty urinating and hematuria.   Skin: Positive for color change and rash.   Allergic/Immunologic: Negative for environmental allergies and food allergies.   Neurological: Negative for headaches.   Psychiatric/Behavioral: Negative for dysphoric mood.     Objective:   There were no vitals filed for this visit.  Physical Exam  Vitals signs reviewed.   Constitutional:       General: She is awake. She is not in acute distress.     Appearance: Normal appearance. She is well-developed, well-groomed and normal weight. She is not ill-appearing.   HENT:      Head: Normocephalic and atraumatic.        Right Ear: External ear normal.      Left Ear: External ear normal.      Nose: Nose normal.      Mouth/Throat:      Lips: Pink.      Pharynx: No pharyngeal swelling, oropharyngeal exudate or posterior oropharyngeal erythema.   Eyes:      Conjunctiva/sclera: Conjunctivae normal.   Pulmonary:      Effort: Pulmonary effort is normal.   Skin:     General: Skin is warm.      Findings: Rash present. Rash is purpuric.          Neurological:      Mental Status: She is alert.   Psychiatric:         Attention and Perception: Attention and perception normal. She is attentive.         Mood and Affect: Mood and affect normal. Mood is not anxious or depressed. Affect is not labile, blunt, angry or inappropriate.         Speech: Speech normal. She is communicative. Speech is not rapid and pressured, delayed, slurred or tangential.         Behavior: Behavior normal. Behavior is not agitated, slowed, aggressive, withdrawn, hyperactive or combative. Behavior is cooperative.         Thought Content: Thought content normal. Thought content is not paranoid or delusional. Thought content  does not include homicidal or suicidal ideation. Thought content does not include homicidal or suicidal plan.         Cognition and Memory: Cognition and memory normal. Memory is not impaired. She does not exhibit impaired recent memory or impaired remote memory.         Judgment: Judgment normal. Judgment is not impulsive or inappropriate.       Assessment:     1. Swelling of lip, tongue, and throat    2. Itching      Plan:   Shane was seen today for oral swelling.    Diagnoses and all orders for this visit:    Swelling of lip, tongue, and throat  Comments:  New problem, uncertain etiology will get Celestone 12 mg IM today start Claritin b.i.d., Pepcid AC b.i.d. and steroid taper tomorrow.  Refer to Allergy   Orders:  -     betamethasone acetate-betamethasone sodium phosphate injection 12 mg  -     Ambulatory referral/consult to Allergy; Future    Itching  Comments:  New problem, uncertain etiology will get Celestone 12 mg IM today start Claritin b.i.d., Pepcid AC b.i.d. and steroid taper tomorrow.  Refer to Allergy   Orders:  -     Ambulatory referral/consult to Allergy; Future    Other orders  -     loratadine (CLARITIN) 10 mg tablet; Take 1 tablet (10 mg total) by mouth once daily.  -     predniSONE (DELTASONE) 20 MG tablet; 3 tab po daily x 3 days, 2 tab po daily x 3 days, 1 tab po daily x 3 days, then 1/2 tab po daily x 3 days  -     famotidine (PEPCID AC) 20 MG tablet; Take 1 tablet (20 mg total) by mouth 2 (two) times daily.  -     triamcinolone acetonide 0.1% (KENALOG) 0.1 % cream; Apply topically 4 (four) times daily. Apply to lips and rash/hives 4 times daily     Advised patient if her symptoms do not improve her she starts to have difficulty breathing or swelling did not call 911 or to go to the local emergency room.  She will be set up for additional allergy testing as she is not currently on any medicines that have contributed to angioedema or not on any new medication changes.        No follow-ups on  file.    Patient Instructions       Angioedema  Angioedema (LO-vue-gk-eh-BIANCA-muh) is a sudden appearance of swollen patches (edema) on the skin or mucous membranes. It most often involves the face, lips, mouth, tongue, back of throat, or vocal cords. It may also occur in other places, such as the arms or legs. A rash may also appear during the first 4 days of this illness.  There are different types of angioedema. Your symptoms will depend on what type of angioedema you have. Swelling and redness may be the main symptoms. Like allergic reactions, angioedema may include:  · Rash, hives, redness, welts, blisters  · Itching, burning, stinging, pain  · Dry, flaky, cracking, or scaly skin  · Swelling of the face, lips, tongue, or other parts of the body  More severe symptoms may include:  · Trouble swallowing, or feeling like your throat is closing  · Trouble breathing or wheezing  · Hoarse voice or trouble speaking  · Nausea, vomiting, diarrhea, or stomach cramps  · Feeling faint or lightheaded, rapid heart rate, or low blood pressure  Angioedema can be triggered by exposure to certain substances. Medical conditions involving the immune systems and certain infections may cause it. In rare cases, angioedema can be hereditary. Sometimes the cause may be very clear. However, it is often hard to find a cause. The most common causes include:  · Foods, such as shrimp, shellfish, peanuts, milk products, gluten, and eggs; also colorings, flavorings, and additives  · Insect bites or stings, from bees, mosquitos, fleas, or ticks  · Medicines, such as ACE inhibitors, penicillin, sulfa drugs, amoxicillin, aspirin, and ibuprofen  · Latex, which may be in gloves, clothes, toys, balloons, and some kinds of tape. People who are allergic to latex may have problems with foods such as bananas, avocados, kiwi, papaya, or chestnuts.  · Stress  · Heat, cold, or sunlight  The most common cause of angioedema is a reaction to a class of  medicines called ACE inhibitors. These are used to treat high blood pressure. ACE inhibitors include captopril, enalapril, and lisinopril. Angiodema can happen even after you have been taking the medicine for some time. Tell your doctor if you have angioedema symptoms and are taking any of these medicines. Angioedema may recur. It is important to watch for the earliest signs of this condition (see the list below). Contact your healthcare provider right away if swelling involves the face, mouth, or throat.  Home care  Rest quietly today. Avoid vigorous physical activity.  Medicines: The healthcare provider may prescribe medicines for itching, swelling, or pain. Follow the healthcare providers instructions when taking these medicines.  · Oral diphenhydramine is an antihistamine available without a prescription. Unless a prescription antihistamine was given, diphenhydramine may be used to reduce widespread itching. It may make you sleepy, so be careful using it when going to school, working, or driving. (Note: Do not use diphenhydramine if you have glaucoma or if you are a man who has trouble urinating due to an enlarged prostate.) Loratadine is an antihistamine that may cause less drowsiness.  · Do not use diphenhydramine cream on your skin. Some people can have an allergic reaction to this.  · Calamine lotion or oatmeal baths sometimes help with itching.  · You may use acetaminophen or ibuprofen for pain, unless another pain medicine was prescribed.  · If you were told that your angioedema was caused by a medicine you are taking, you must stop taking it. Ask your healthcare provider for a different one. In the future, advise medical staff that you are allergic to this medicine.  · If medicine was prescribed, such as steroids or antihistamines, be sure you understand what the medicine is and how to take it.   General care  · Make sure you do not scratch areas of the body that had a reaction. This will help prevent  infection.   · Stay away from air pollution, tobacco, and wood smoke. Also stay away from cold temperatures. These things can make allergy symptoms worse.  · Try to find out what cause your reaction. Make sure to remove the allergen. Future reactions may be worse.   · If you have a serious allergy, wear a medical alert bracelet that notes this allergy.  · If the healthcare provider prescribed an epinephrine auto injector kit, keep it with you at all times.   · Tell all care providers about your allergy. Ask them h ow to use any prescribed medicines.  · Keep a record of allergies and symptoms, and when they occurred. This will help your provider treat you over time.   Follow-up care  Follow up with your healthcare provider, or as advised. You may need to see an allergist. An allergist can help find the cause of an allergic reaction and give recommendations on how to prevent future reactions.  Call 911  Contact emergency services right away if any of these occur:  · Trouble breathing or swallowing, or wheezing  · Hoarse voice or trouble speaking, or drooling  · Chest pain or tightness  · Confusion, lightheadedness, or dizziness  · Extreme drowsiness or trouble awakening  · Fainting or loss of consciousness  · Rapid heart rate  · Vomiting blood, or large amounts of blood in stool  · Seizure  · Nausea, vomiting, diarrhea, abdominal pain, or stomach cramps  When to seek medical attention  Call your healthcare provider right away if any of the following occur:  · Symptoms don't go away  · Symptoms come back  · Symptoms get worse or new symptoms develop  · Hives feel uncomfortable  · Fever of 100.4°F (38°C), or as directed by your healthcare professional  Date Last Reviewed: 5/1/2017  © 4692-6338 [employer]. [address] [place] [address], [place] [address]. All rights reserved. This information is not intended as a substitute for professional medical care. Always follow your healthcare professional's  instructions.        When Your Child Has Hives (Urticaria) or Angioedema    Hives (also called urticaria) are raised, red, itchy bumps on the skin. The bumps come and go for a few days and then disappear completely. Although hives can be uncomfortable, they wont harm your child or leave scars. Sometimes your child may have severe swelling around the lips or eyes. This is a more serious skin reaction called angioedema. It can happen with hives or on its own.  What causes hives?  Hives often develop when cells in your childs skin release a chemical called histamine during an allergic reaction. The histamine produces swelling, redness, and itching. Here are some of the most common causes:  · Foods, such as peanuts, shellfish, tree nuts, eggs, and milk, and food additives, such as monosodium glutamate (MSG) and artificial colorings.  · Viral infections  · Prescription and over-the-counter medicines. These include antibiotics (penicillin), sulfa, anticonvulsant drugs, phenobarbital, aspirin, and ibuprofen.  · Extreme heat or cold:  ¨ Cold-induced hives. These hives are caused by exposure to cold air or water.  ¨ Solar hives. These hives are caused by exposure to sunlight or light bulb light.  · Emotional stress  · Dematographism. These hives are cause by scratching the skin, continual striking of the skin, or wearing tight-fitting clothes that rub the skin.  · Chronic urticaria. These are hives that keep coming back and with no known cause.  · Exercise-induced urticaria. These allergic symptoms are brought on by physical activity.  What do hives look like?  Hives are itchy bumps that can vary in color from pink to deep red. They come in different sizes and sometimes spread to form large patches of swollen skin. Hives can appear on one part of the body and disappear on another in a matter of hours. Each hive lasts less than a day, but new hives may keep forming for days or even weeks.  How are hives diagnosed?  Your  childs healthcare provider can diagnose hives by looking at your childs skin and taking a complete health history. He or she may also do skin tests. These look for foods or other substances that your child may be sensitive to. Blood tests may be done to rule out causes of hives not related to allergies. In most cases, the cause of hives is never found.  How are hives treated?  For mild symptoms:  · Give your child an oral over-the-counter antihistamine that has diphenhydramine. Talk about this with your child's healthcare provider  · To relieve itching and swelling, apply calamine lotion or cool compresses, or have your child soak in a cool bath. (Adding 2 cups of ground oatmeal to the tub may make your child more comfortable).  For more severe symptoms, your childs healthcare provider may prescribe:  · A prescription or over-the-counter oral antihistamine to block the chemical in the body that causes allergic reactions. Your child is likely to take it every 4 to 6 hours for several days. Some antihistamines may make your child drowsy. Some work faster than others. Ask your childs healthcare provider which antihistamine to use and the correct dose to give your child.  · An oral steroid to relieve severe swelling of the throat and airways. Its usually taken for 3 to 5 days.  · Epinephrine (adrenaline) to use in an emergency to stop a severe allergic reaction. If swelling affects your childs breathing, get emergency care RIGHT AWAY. Your child is likely to need an injection of epinephrine to stop the allergic response.  Angioedema  Angioedema is a type of allergic reaction that sometimes happens along with hives. It causes swelling deep in the skin, especially around the lips and eyes. Swelling can make it hard to breathe. If this happens, seek medical care right away.   Preventing hives  To help prevent hives, avoid any substances your child is sensitive to:  · If your child has food allergies: Read labels  carefully, and use caution in restaurants.  · Tell your childs healthcare provider, dentist, and pharmacist about any allergies your child has to medicines. Keep a list of alternate medicines handy.  Call 911 or emergency services right away  It is an emergency if your child has hives and any of the following:   · Wheezing, or trouble breathing or swallowing  · Swelling of the lips, tongue, or throat  · Dizziness  · Loss of consciousness   Date Last Reviewed: 12/1/2016 © 2000-2017 [employer]. [address] [place] [address], [place] [address]. All rights reserved. This information is not intended as a substitute for professional medical care. Always follow your healthcare professional's instructions.

## 2020-10-27 NOTE — TELEPHONE ENCOUNTER
----- Message from Sulaiman Bolden sent at 10/27/2020  7:25 AM CDT -----  ..Type:  Needs Medical Advice    Who Called:  pt   Symptoms (please be specific): 4 days   How long has patient had these symptoms:   tongue lips  Pharmacy name and phone #:  .  [id number]  [place] - [id number]  [address] [facility] [address]  Phone: [phone] Fax: [phone]     Would the patient rather a call back or a response via [facility]? Call back   Best Call Back Number: [phone]  Additional Information:Pt is requesting a call from nurse to discuss she hasn't eaten any thing different but she has some swelling

## 2021-02-26 ENCOUNTER — PATIENT MESSAGE (OUTPATIENT)
Dept: PRIMARY CARE CLINIC | Facility: CLINIC | Age: 51
End: 2021-02-26

## 2021-03-10 ENCOUNTER — OFFICE VISIT (OUTPATIENT)
Dept: PRIMARY CARE CLINIC | Facility: CLINIC | Age: 51
End: 2021-03-10
Payer: COMMERCIAL

## 2021-03-10 DIAGNOSIS — R22.1 SWELLING OF LIP, TONGUE, AND THROAT: ICD-10-CM

## 2021-03-10 DIAGNOSIS — R22.0 SWELLING OF LIP, TONGUE, AND THROAT: ICD-10-CM

## 2021-03-10 DIAGNOSIS — J30.89 SEASONAL ALLERGIC RHINITIS DUE TO OTHER ALLERGIC TRIGGER: ICD-10-CM

## 2021-03-10 DIAGNOSIS — F41.1 ANXIETY STATE: Primary | ICD-10-CM

## 2021-03-10 PROCEDURE — 99214 PR OFFICE/OUTPT VISIT, EST, LEVL IV, 30-39 MIN: ICD-10-PCS | Mod: 95,,, | Performed by: FAMILY MEDICINE

## 2021-03-10 PROCEDURE — 99214 OFFICE O/P EST MOD 30 MIN: CPT | Mod: 95,,, | Performed by: FAMILY MEDICINE

## 2021-03-10 RX ORDER — ALPRAZOLAM 0.25 MG/1
0.25 TABLET ORAL DAILY PRN
Qty: 30 TABLET | Refills: 0 | Status: SHIPPED | OUTPATIENT
Start: 2021-03-10 | End: 2021-09-17 | Stop reason: SDUPTHER

## 2021-05-15 ENCOUNTER — OFFICE VISIT (OUTPATIENT)
Dept: URGENT CARE | Facility: CLINIC | Age: 51
End: 2021-05-15
Payer: COMMERCIAL

## 2021-05-15 VITALS
BODY MASS INDEX: 26.46 KG/M2 | HEIGHT: 64 IN | SYSTOLIC BLOOD PRESSURE: 143 MMHG | TEMPERATURE: 98 F | HEART RATE: 89 BPM | WEIGHT: 155 LBS | OXYGEN SATURATION: 100 % | RESPIRATION RATE: 18 BRPM | DIASTOLIC BLOOD PRESSURE: 90 MMHG

## 2021-05-15 DIAGNOSIS — J20.9 ACUTE BRONCHITIS, UNSPECIFIED ORGANISM: Primary | ICD-10-CM

## 2021-05-15 PROCEDURE — 96372 THER/PROPH/DIAG INJ SC/IM: CPT | Mod: S$GLB,,, | Performed by: PHYSICIAN ASSISTANT

## 2021-05-15 PROCEDURE — 96372 PR INJECTION,THERAP/PROPH/DIAG2ST, IM OR SUBCUT: ICD-10-PCS | Mod: S$GLB,,, | Performed by: PHYSICIAN ASSISTANT

## 2021-05-15 PROCEDURE — 99214 OFFICE O/P EST MOD 30 MIN: CPT | Mod: 25,S$GLB,, | Performed by: PHYSICIAN ASSISTANT

## 2021-05-15 PROCEDURE — 99214 PR OFFICE/OUTPT VISIT, EST, LEVL IV, 30-39 MIN: ICD-10-PCS | Mod: 25,S$GLB,, | Performed by: PHYSICIAN ASSISTANT

## 2021-05-15 RX ORDER — DEXAMETHASONE SODIUM PHOSPHATE 100 MG/10ML
6 INJECTION INTRAMUSCULAR; INTRAVENOUS
Status: COMPLETED | OUTPATIENT
Start: 2021-05-15 | End: 2021-05-15

## 2021-05-15 RX ORDER — HYDROCODONE POLISTIREX AND CHLORPHENIRAMINE POLISTIREX 10; 8 MG/5ML; MG/5ML
5 SUSPENSION, EXTENDED RELEASE ORAL EVERY 12 HOURS PRN
Qty: 70 ML | Refills: 0 | Status: SHIPPED | OUTPATIENT
Start: 2021-05-15 | End: 2021-05-22

## 2021-05-15 RX ADMIN — DEXAMETHASONE SODIUM PHOSPHATE 6 MG: 100 INJECTION INTRAMUSCULAR; INTRAVENOUS at 09:05

## 2021-05-18 ENCOUNTER — TELEPHONE (OUTPATIENT)
Dept: URGENT CARE | Facility: CLINIC | Age: 51
End: 2021-05-18

## 2021-09-17 ENCOUNTER — OFFICE VISIT (OUTPATIENT)
Dept: PRIMARY CARE CLINIC | Facility: CLINIC | Age: 51
End: 2021-09-17
Payer: COMMERCIAL

## 2021-09-17 VITALS
BODY MASS INDEX: 26.19 KG/M2 | TEMPERATURE: 97 F | HEART RATE: 88 BPM | SYSTOLIC BLOOD PRESSURE: 124 MMHG | WEIGHT: 152.56 LBS | DIASTOLIC BLOOD PRESSURE: 80 MMHG | OXYGEN SATURATION: 98 %

## 2021-09-17 DIAGNOSIS — F41.9 ANXIETY: ICD-10-CM

## 2021-09-17 DIAGNOSIS — F43.0 ACUTE STRESS DISORDER: ICD-10-CM

## 2021-09-17 DIAGNOSIS — T14.8XXA MUSCLE STRAIN: Primary | ICD-10-CM

## 2021-09-17 PROCEDURE — 99214 PR OFFICE/OUTPT VISIT, EST, LEVL IV, 30-39 MIN: ICD-10-PCS | Mod: 25,S$GLB,, | Performed by: PHYSICIAN ASSISTANT

## 2021-09-17 PROCEDURE — 99214 OFFICE O/P EST MOD 30 MIN: CPT | Mod: 25,S$GLB,, | Performed by: PHYSICIAN ASSISTANT

## 2021-09-17 PROCEDURE — 96372 THER/PROPH/DIAG INJ SC/IM: CPT | Mod: S$GLB,,, | Performed by: PHYSICIAN ASSISTANT

## 2021-09-17 PROCEDURE — 99999 PR PBB SHADOW E&M-EST. PATIENT-LVL IV: ICD-10-PCS | Mod: PBBFAC,,, | Performed by: PHYSICIAN ASSISTANT

## 2021-09-17 PROCEDURE — 99999 PR PBB SHADOW E&M-EST. PATIENT-LVL IV: CPT | Mod: PBBFAC,,, | Performed by: PHYSICIAN ASSISTANT

## 2021-09-17 PROCEDURE — 96372 PR INJECTION,THERAP/PROPH/DIAG2ST, IM OR SUBCUT: ICD-10-PCS | Mod: S$GLB,,, | Performed by: PHYSICIAN ASSISTANT

## 2021-09-17 RX ORDER — KETOROLAC TROMETHAMINE 30 MG/ML
60 INJECTION, SOLUTION INTRAMUSCULAR; INTRAVENOUS
Status: COMPLETED | OUTPATIENT
Start: 2021-09-17 | End: 2021-09-17

## 2021-09-17 RX ORDER — METHOCARBAMOL 500 MG/1
500 TABLET, FILM COATED ORAL NIGHTLY PRN
Qty: 30 TABLET | Refills: 0 | Status: SHIPPED | OUTPATIENT
Start: 2021-09-17 | End: 2021-10-17

## 2021-09-17 RX ORDER — BUSPIRONE HYDROCHLORIDE 10 MG/1
10 TABLET ORAL 3 TIMES DAILY PRN
Qty: 90 TABLET | Refills: 11 | Status: SHIPPED | OUTPATIENT
Start: 2021-09-17 | End: 2021-10-25

## 2021-09-17 RX ORDER — ALPRAZOLAM 0.25 MG/1
0.25 TABLET ORAL DAILY PRN
Qty: 30 TABLET | Refills: 0 | Status: SHIPPED | OUTPATIENT
Start: 2021-09-17 | End: 2021-10-25

## 2021-09-17 RX ADMIN — KETOROLAC TROMETHAMINE 60 MG: 30 INJECTION, SOLUTION INTRAMUSCULAR; INTRAVENOUS at 11:09

## 2021-10-22 RX ORDER — PHENAZOPYRIDINE HYDROCHLORIDE 200 MG/1
TABLET, FILM COATED ORAL
COMMUNITY
Start: 2020-12-06 | End: 2021-11-16

## 2021-10-22 RX ORDER — SULFAMETHOXAZOLE AND TRIMETHOPRIM 800; 160 MG/1; MG/1
TABLET ORAL
COMMUNITY
Start: 2020-12-06 | End: 2021-10-25

## 2021-11-14 ENCOUNTER — PATIENT OUTREACH (OUTPATIENT)
Dept: ADMINISTRATIVE | Facility: OTHER | Age: 51
End: 2021-11-14
Payer: COMMERCIAL

## 2021-11-16 ENCOUNTER — HOSPITAL ENCOUNTER (OUTPATIENT)
Dept: RADIOLOGY | Facility: HOSPITAL | Age: 51
Discharge: HOME | End: 2021-11-16
Attending: PHYSICIAN ASSISTANT
Payer: COMMERCIAL

## 2021-11-16 ENCOUNTER — OFFICE VISIT (OUTPATIENT)
Dept: GASTROENTEROLOGY | Facility: CLINIC | Age: 51
End: 2021-11-16
Payer: COMMERCIAL

## 2021-11-16 VITALS
WEIGHT: 151 LBS | HEART RATE: 108 BPM | DIASTOLIC BLOOD PRESSURE: 100 MMHG | BODY MASS INDEX: 25.78 KG/M2 | HEIGHT: 64 IN | SYSTOLIC BLOOD PRESSURE: 140 MMHG

## 2021-11-16 DIAGNOSIS — R14.0 BLOATING: ICD-10-CM

## 2021-11-16 DIAGNOSIS — Z12.11 COLON CANCER SCREENING: ICD-10-CM

## 2021-11-16 DIAGNOSIS — R19.7 DIARRHEA, UNSPECIFIED TYPE: ICD-10-CM

## 2021-11-16 DIAGNOSIS — R19.7 DIARRHEA, UNSPECIFIED TYPE: Primary | ICD-10-CM

## 2021-11-16 PROCEDURE — 74019 RADEX ABDOMEN 2 VIEWS: CPT | Mod: TC

## 2021-11-16 PROCEDURE — 74019 RADEX ABDOMEN 2 VIEWS: CPT | Mod: 26,,, | Performed by: RADIOLOGY

## 2021-11-16 PROCEDURE — 99999 PR PBB SHADOW E&M-EST. PATIENT-LVL III: ICD-10-PCS | Mod: PBBFAC,,, | Performed by: PHYSICIAN ASSISTANT

## 2021-11-16 PROCEDURE — 99203 PR OFFICE/OUTPT VISIT, NEW, LEVL III, 30-44 MIN: ICD-10-PCS | Mod: S$GLB,,, | Performed by: PHYSICIAN ASSISTANT

## 2021-11-16 PROCEDURE — 74019 XR ABDOMEN FLAT AND ERECT: ICD-10-PCS | Mod: 26,,, | Performed by: RADIOLOGY

## 2021-11-16 PROCEDURE — 99999 PR PBB SHADOW E&M-EST. PATIENT-LVL III: CPT | Mod: PBBFAC,,, | Performed by: PHYSICIAN ASSISTANT

## 2021-11-16 PROCEDURE — 99203 OFFICE O/P NEW LOW 30 MIN: CPT | Mod: S$GLB,,, | Performed by: PHYSICIAN ASSISTANT

## 2021-11-16 RX ORDER — SOD SULF/POT CHLORIDE/MAG SULF 1.479 G
12 TABLET ORAL DAILY
Qty: 24 TABLET | Refills: 0 | Status: SHIPPED | OUTPATIENT
Start: 2021-11-16 | End: 2022-06-14 | Stop reason: ALTCHOICE

## 2021-12-06 ENCOUNTER — LAB VISIT (OUTPATIENT)
Dept: PRIMARY CARE CLINIC | Facility: CLINIC | Age: 51
End: 2021-12-06
Payer: COMMERCIAL

## 2021-12-06 DIAGNOSIS — Z12.11 COLON CANCER SCREENING: ICD-10-CM

## 2021-12-06 PROCEDURE — U0003 INFECTIOUS AGENT DETECTION BY NUCLEIC ACID (DNA OR RNA); SEVERE ACUTE RESPIRATORY SYNDROME CORONAVIRUS 2 (SARS-COV-2) (CORONAVIRUS DISEASE [COVID-19]), AMPLIFIED PROBE TECHNIQUE, MAKING USE OF HIGH THROUGHPUT TECHNOLOGIES AS DESCRIBED BY CMS-2020-01-R: HCPCS | Performed by: PHYSICIAN ASSISTANT

## 2021-12-08 ENCOUNTER — ANESTHESIA EVENT (OUTPATIENT)
Dept: ENDOSCOPY | Facility: HOSPITAL | Age: 51
End: 2021-12-08
Payer: COMMERCIAL

## 2021-12-09 ENCOUNTER — HOSPITAL ENCOUNTER (OUTPATIENT)
Facility: HOSPITAL | Age: 51
Discharge: HOME | End: 2021-12-09
Attending: INTERNAL MEDICINE | Admitting: INTERNAL MEDICINE
Payer: COMMERCIAL

## 2021-12-09 ENCOUNTER — ANESTHESIA (OUTPATIENT)
Dept: ENDOSCOPY | Facility: HOSPITAL | Age: 51
End: 2021-12-09
Payer: COMMERCIAL

## 2021-12-09 VITALS
HEIGHT: 64 IN | TEMPERATURE: 98 F | WEIGHT: 147.69 LBS | OXYGEN SATURATION: 100 % | BODY MASS INDEX: 25.21 KG/M2 | HEART RATE: 70 BPM | SYSTOLIC BLOOD PRESSURE: 136 MMHG | RESPIRATION RATE: 16 BRPM | DIASTOLIC BLOOD PRESSURE: 80 MMHG

## 2021-12-09 DIAGNOSIS — Z01.818 PREOP TESTING: ICD-10-CM

## 2021-12-09 LAB — SARS-COV-2 RDRP RESP QL NAA+PROBE: NEGATIVE

## 2021-12-09 PROCEDURE — 63600175 PHARM REV CODE 636 W HCPCS: Performed by: INTERNAL MEDICINE

## 2021-12-09 PROCEDURE — 45380 PR COLONOSCOPY,BIOPSY: ICD-10-PCS | Mod: ,,, | Performed by: INTERNAL MEDICINE

## 2021-12-09 PROCEDURE — 27201012 HC FORCEPS, HOT/COLD, DISP: Performed by: INTERNAL MEDICINE

## 2021-12-09 PROCEDURE — 88305 TISSUE EXAM BY PATHOLOGIST: ICD-10-PCS | Mod: 26,,, | Performed by: PATHOLOGY

## 2021-12-09 PROCEDURE — 93010 ELECTROCARDIOGRAM REPORT: CPT | Mod: ,,, | Performed by: INTERNAL MEDICINE

## 2021-12-09 PROCEDURE — 25000003 PHARM REV CODE 250: Performed by: NURSE ANESTHETIST, CERTIFIED REGISTERED

## 2021-12-09 PROCEDURE — 88305 TISSUE EXAM BY PATHOLOGIST: CPT | Performed by: PATHOLOGY

## 2021-12-09 PROCEDURE — 93010 EKG 12-LEAD: ICD-10-PCS | Mod: ,,, | Performed by: INTERNAL MEDICINE

## 2021-12-09 PROCEDURE — 63600175 PHARM REV CODE 636 W HCPCS: Performed by: NURSE ANESTHETIST, CERTIFIED REGISTERED

## 2021-12-09 PROCEDURE — U0002 COVID-19 LAB TEST NON-CDC: HCPCS | Performed by: INTERNAL MEDICINE

## 2021-12-09 PROCEDURE — 37000009 HC ANESTHESIA EA ADD 15 MINS: Performed by: INTERNAL MEDICINE

## 2021-12-09 PROCEDURE — 45380 COLONOSCOPY AND BIOPSY: CPT | Performed by: INTERNAL MEDICINE

## 2021-12-09 PROCEDURE — D9220A PRA ANESTHESIA: ICD-10-PCS | Mod: CRNA,,, | Performed by: NURSE ANESTHETIST, CERTIFIED REGISTERED

## 2021-12-09 PROCEDURE — D9220A PRA ANESTHESIA: ICD-10-PCS | Mod: ANES,,, | Performed by: ANESTHESIOLOGY

## 2021-12-09 PROCEDURE — 37000008 HC ANESTHESIA 1ST 15 MINUTES: Performed by: INTERNAL MEDICINE

## 2021-12-09 PROCEDURE — D9220A PRA ANESTHESIA: Mod: ANES,,, | Performed by: ANESTHESIOLOGY

## 2021-12-09 PROCEDURE — 93005 ELECTROCARDIOGRAM TRACING: CPT | Mod: 59

## 2021-12-09 PROCEDURE — 45380 COLONOSCOPY AND BIOPSY: CPT | Mod: ,,, | Performed by: INTERNAL MEDICINE

## 2021-12-09 PROCEDURE — D9220A PRA ANESTHESIA: Mod: CRNA,,, | Performed by: NURSE ANESTHETIST, CERTIFIED REGISTERED

## 2021-12-09 PROCEDURE — 88305 TISSUE EXAM BY PATHOLOGIST: CPT | Mod: 26,,, | Performed by: PATHOLOGY

## 2021-12-09 RX ORDER — LIDOCAINE HYDROCHLORIDE 10 MG/ML
INJECTION, SOLUTION EPIDURAL; INFILTRATION; INTRACAUDAL; PERINEURAL
Status: DISCONTINUED | OUTPATIENT
Start: 2021-12-09 | End: 2021-12-09

## 2021-12-09 RX ORDER — PROPOFOL 10 MG/ML
VIAL (ML) INTRAVENOUS
Status: DISCONTINUED | OUTPATIENT
Start: 2021-12-09 | End: 2021-12-09

## 2021-12-09 RX ORDER — SODIUM CHLORIDE, SODIUM LACTATE, POTASSIUM CHLORIDE, CALCIUM CHLORIDE 600; 310; 30; 20 MG/100ML; MG/100ML; MG/100ML; MG/100ML
INJECTION, SOLUTION INTRAVENOUS CONTINUOUS
Status: DISCONTINUED | OUTPATIENT
Start: 2021-12-09 | End: 2021-12-09 | Stop reason: HOSPADM

## 2021-12-09 RX ADMIN — PROPOFOL 100 MG: 10 INJECTION, EMULSION INTRAVENOUS at 08:12

## 2021-12-09 RX ADMIN — LIDOCAINE HYDROCHLORIDE 40 MG: 10 INJECTION, SOLUTION EPIDURAL; INFILTRATION; INTRACAUDAL; PERINEURAL at 08:12

## 2021-12-09 RX ADMIN — PROPOFOL 50 MG: 10 INJECTION, EMULSION INTRAVENOUS at 08:12

## 2021-12-09 RX ADMIN — SODIUM CHLORIDE, SODIUM LACTATE, POTASSIUM CHLORIDE, AND CALCIUM CHLORIDE: 600; 310; 30; 20 INJECTION, SOLUTION INTRAVENOUS at 08:12

## 2021-12-10 ENCOUNTER — TELEPHONE (OUTPATIENT)
Dept: GASTROENTEROLOGY | Facility: CLINIC | Age: 51
End: 2021-12-10
Payer: COMMERCIAL

## 2021-12-13 LAB — SARS-COV-2 RNA RESP QL NAA+PROBE: NOT DETECTED

## 2021-12-15 LAB
FINAL PATHOLOGIC DIAGNOSIS: NORMAL
GROSS: NORMAL
Lab: NORMAL

## 2021-12-16 ENCOUNTER — PATIENT MESSAGE (OUTPATIENT)
Dept: GASTROENTEROLOGY | Facility: CLINIC | Age: 51
End: 2021-12-16
Payer: COMMERCIAL

## 2021-12-29 ENCOUNTER — TELEPHONE (OUTPATIENT)
Dept: PRIMARY CARE CLINIC | Facility: CLINIC | Age: 51
End: 2021-12-29
Payer: COMMERCIAL

## 2022-06-14 ENCOUNTER — LAB VISIT (OUTPATIENT)
Dept: LAB | Facility: HOSPITAL | Age: 52
End: 2022-06-14
Attending: FAMILY MEDICINE
Payer: COMMERCIAL

## 2022-06-14 ENCOUNTER — OFFICE VISIT (OUTPATIENT)
Dept: PRIMARY CARE CLINIC | Facility: CLINIC | Age: 52
End: 2022-06-14
Payer: COMMERCIAL

## 2022-06-14 VITALS
SYSTOLIC BLOOD PRESSURE: 124 MMHG | WEIGHT: 144.19 LBS | HEART RATE: 76 BPM | DIASTOLIC BLOOD PRESSURE: 82 MMHG | HEIGHT: 64 IN | BODY MASS INDEX: 24.62 KG/M2

## 2022-06-14 DIAGNOSIS — Z00.00 ROUTINE GENERAL MEDICAL EXAMINATION AT A HEALTH CARE FACILITY: ICD-10-CM

## 2022-06-14 DIAGNOSIS — Z00.00 ROUTINE GENERAL MEDICAL EXAMINATION AT A HEALTH CARE FACILITY: Primary | ICD-10-CM

## 2022-06-14 DIAGNOSIS — F41.1 GAD (GENERALIZED ANXIETY DISORDER): ICD-10-CM

## 2022-06-14 DIAGNOSIS — R10.9 ABDOMINAL SPASMS: ICD-10-CM

## 2022-06-14 LAB
ALBUMIN SERPL BCP-MCNC: 4.2 G/DL (ref 3.5–5.2)
ALP SERPL-CCNC: 73 U/L (ref 55–135)
ALT SERPL W/O P-5'-P-CCNC: 19 U/L (ref 10–44)
ANION GAP SERPL CALC-SCNC: 12 MMOL/L (ref 8–16)
AST SERPL-CCNC: 24 U/L (ref 10–40)
BASOPHILS # BLD AUTO: 0.02 K/UL (ref 0–0.2)
BASOPHILS NFR BLD: 0.4 % (ref 0–1.9)
BILIRUB SERPL-MCNC: 0.6 MG/DL (ref 0.1–1)
BUN SERPL-MCNC: 9 MG/DL (ref 6–20)
CALCIUM SERPL-MCNC: 9.2 MG/DL (ref 8.7–10.5)
CHLORIDE SERPL-SCNC: 100 MMOL/L (ref 95–110)
CHOLEST SERPL-MCNC: 192 MG/DL (ref 120–199)
CHOLEST/HDLC SERPL: 3.2 {RATIO} (ref 2–5)
CO2 SERPL-SCNC: 23 MMOL/L (ref 23–29)
CREAT SERPL-MCNC: 0.7 MG/DL (ref 0.5–1.4)
DIFFERENTIAL METHOD: ABNORMAL
EOSINOPHIL # BLD AUTO: 0.1 K/UL (ref 0–0.5)
EOSINOPHIL NFR BLD: 1.1 % (ref 0–8)
ERYTHROCYTE [DISTWIDTH] IN BLOOD BY AUTOMATED COUNT: 11.4 % (ref 11.5–14.5)
EST. GFR  (AFRICAN AMERICAN): >60 ML/MIN/1.73 M^2
EST. GFR  (NON AFRICAN AMERICAN): >60 ML/MIN/1.73 M^2
ESTIMATED AVG GLUCOSE: 91 MG/DL (ref 68–131)
GLUCOSE SERPL-MCNC: 90 MG/DL (ref 70–110)
HBA1C MFR BLD: 4.8 % (ref 4–5.6)
HCT VFR BLD AUTO: 38.5 % (ref 37–48.5)
HDLC SERPL-MCNC: 60 MG/DL (ref 40–75)
HDLC SERPL: 31.3 % (ref 20–50)
HGB BLD-MCNC: 12.5 G/DL (ref 12–16)
IMM GRANULOCYTES # BLD AUTO: 0.01 K/UL (ref 0–0.04)
IMM GRANULOCYTES NFR BLD AUTO: 0.2 % (ref 0–0.5)
LDLC SERPL CALC-MCNC: 102.2 MG/DL (ref 63–159)
LYMPHOCYTES # BLD AUTO: 1.4 K/UL (ref 1–4.8)
LYMPHOCYTES NFR BLD: 24.4 % (ref 18–48)
MCH RBC QN AUTO: 32 PG (ref 27–31)
MCHC RBC AUTO-ENTMCNC: 32.5 G/DL (ref 32–36)
MCV RBC AUTO: 99 FL (ref 82–98)
MONOCYTES # BLD AUTO: 0.4 K/UL (ref 0.3–1)
MONOCYTES NFR BLD: 8 % (ref 4–15)
NEUTROPHILS # BLD AUTO: 3.7 K/UL (ref 1.8–7.7)
NEUTROPHILS NFR BLD: 65.9 % (ref 38–73)
NONHDLC SERPL-MCNC: 132 MG/DL
NRBC BLD-RTO: 0 /100 WBC
PLATELET # BLD AUTO: 225 K/UL (ref 150–450)
PMV BLD AUTO: 12.7 FL (ref 9.2–12.9)
POTASSIUM SERPL-SCNC: 4.8 MMOL/L (ref 3.5–5.1)
PROT SERPL-MCNC: 7.4 G/DL (ref 6–8.4)
RBC # BLD AUTO: 3.91 M/UL (ref 4–5.4)
SODIUM SERPL-SCNC: 135 MMOL/L (ref 136–145)
T4 FREE SERPL-MCNC: 1.01 NG/DL (ref 0.71–1.51)
TRIGL SERPL-MCNC: 149 MG/DL (ref 30–150)
TSH SERPL DL<=0.005 MIU/L-ACNC: 2.56 UIU/ML (ref 0.4–4)
WBC # BLD AUTO: 5.53 K/UL (ref 3.9–12.7)

## 2022-06-14 PROCEDURE — 85025 COMPLETE CBC W/AUTO DIFF WBC: CPT | Performed by: FAMILY MEDICINE

## 2022-06-14 PROCEDURE — 83036 HEMOGLOBIN GLYCOSYLATED A1C: CPT | Performed by: FAMILY MEDICINE

## 2022-06-14 PROCEDURE — 84443 ASSAY THYROID STIM HORMONE: CPT | Performed by: FAMILY MEDICINE

## 2022-06-14 PROCEDURE — 99396 PREV VISIT EST AGE 40-64: CPT | Mod: S$GLB,,, | Performed by: FAMILY MEDICINE

## 2022-06-14 PROCEDURE — 99999 PR PBB SHADOW E&M-EST. PATIENT-LVL III: CPT | Mod: PBBFAC,,, | Performed by: FAMILY MEDICINE

## 2022-06-14 PROCEDURE — 80061 LIPID PANEL: CPT | Performed by: FAMILY MEDICINE

## 2022-06-14 PROCEDURE — 80053 COMPREHEN METABOLIC PANEL: CPT | Performed by: FAMILY MEDICINE

## 2022-06-14 PROCEDURE — 99999 PR PBB SHADOW E&M-EST. PATIENT-LVL III: ICD-10-PCS | Mod: PBBFAC,,, | Performed by: FAMILY MEDICINE

## 2022-06-14 PROCEDURE — 36415 COLL VENOUS BLD VENIPUNCTURE: CPT | Mod: PN | Performed by: FAMILY MEDICINE

## 2022-06-14 PROCEDURE — 99396 PR PREVENTIVE VISIT,EST,40-64: ICD-10-PCS | Mod: S$GLB,,, | Performed by: FAMILY MEDICINE

## 2022-06-14 PROCEDURE — 84439 ASSAY OF FREE THYROXINE: CPT | Performed by: FAMILY MEDICINE

## 2022-06-14 RX ORDER — DICYCLOMINE HYDROCHLORIDE 20 MG/1
20 TABLET ORAL
Qty: 60 TABLET | Refills: 2 | Status: SHIPPED | OUTPATIENT
Start: 2022-06-14 | End: 2022-10-06

## 2022-06-14 RX ORDER — ALPRAZOLAM 0.25 MG/1
0.25 TABLET ORAL 2 TIMES DAILY PRN
Qty: 30 TABLET | Refills: 0 | Status: SHIPPED | OUTPATIENT
Start: 2022-06-14 | End: 2022-10-27 | Stop reason: SDUPTHER

## 2022-06-14 RX ORDER — ESCITALOPRAM OXALATE 10 MG/1
5-10 TABLET ORAL NIGHTLY
Qty: 30 TABLET | Refills: 11 | Status: SHIPPED | OUTPATIENT
Start: 2022-06-14

## 2022-06-14 NOTE — PROGRESS NOTES
Subjective:      Patient ID: Shane Mckeon is a 52 y.o. female.    Chief Complaint: Annual Exam    Disclaimer:  This note is prepared using voice recognition software and as such is likely to have errors and has not been proof read. Please contact me for questions.     Shane Mckeon is a 52 y.o. female who presents to clinic for annual exam.   She tried buspar but made her sick, nauseated, so d/c'ed it.   Did colonoscopy for ongoing issues with diarrhea.  She personally feels that it is stress related.  She is willing now to do something because it started to affect her daily with chest pain as well.  She reports she gets some arm pain also at times.  The colonoscopy did show diverticulosis as well as a 5 mm polyp.  It was recommended that she repeated in 5 years.  She never did the follow-up due to her insurance.    She still sees Dr. Margot Fragoso for GYN on estrogen a 1.5 mg tablets still rarely will use Xanax occasionally.  Is having hot flashes though in poor sleep.  She quit her job last year and is working at a different .  She reports that stressful often.    Needs labs today.       No results found for: HGBA1C   Lab Results   Component Value Date    CHOL 210 (H) 08/13/2020    CHOL 215 (H) 04/23/2019    CHOL 226 (H) 11/28/2017     Lab Results   Component Value Date    LDLCALC 104.6 08/13/2020    LDLCALC 143.4 04/23/2019    LDLCALC 143.6 11/28/2017       Wt Readings from Last 10 Encounters:   06/14/22 65.4 kg (144 lb 2.9 oz)   12/09/21 67 kg (147 lb 11.3 oz)   11/16/21 68.5 kg (151 lb 0.2 oz)   10/25/21 68.5 kg (151 lb)   09/17/21 69.2 kg (152 lb 8.9 oz)   05/15/21 70.3 kg (155 lb)   08/13/20 72.6 kg (159 lb 15.1 oz)   06/26/19 66.8 kg (147 lb 4.3 oz)   06/18/19 68.5 kg (151 lb 0.2 oz)   04/23/19 66.6 kg (146 lb 13.2 oz)       The 10-year ASCVD risk score ([place] JM Egan, et al., 2013) is: 1.1%    Values used to calculate the score:      Age: 52 years      Sex: Female      Is Non-  : No      Diabetic: No      Tobacco smoker: No      Systolic Blood Pressure: 124 mmHg      Is BP treated: No      HDL Cholesterol: 60 mg/dL      Total Cholesterol: 210 mg/dL        Lab Results   Component Value Date    WBC 6.79 08/13/2020    HGB 12.1 08/13/2020    HCT 39.3 08/13/2020     08/13/2020    CHOL 210 (H) 08/13/2020    TRIG 227 (H) 08/13/2020    HDL 60 08/13/2020    ALT 21 08/13/2020    AST 24 08/13/2020     08/13/2020    K 4.2 08/13/2020     08/13/2020    CREATININE 0.7 08/13/2020    BUN 8 08/13/2020    CO2 25 08/13/2020    TSH 2.039 08/13/2020       X-Ray Abdomen Flat And Erect  Narrative: EXAMINATION:  XR ABDOMEN FLAT AND ERECT    CLINICAL HISTORY:  Abdominal distension (gaseous)    TECHNIQUE:  Flat and erect AP views of the abdomen were performed.    COMPARISON:  June 2019    FINDINGS:  Clear lung bases.  Intact bones.  No pathologic calcifications.  No evidence of bowel obstruction.  Tiny pelvic phleboliths.  Impression: As above    Electronically signed by: Cliff Lobato MD  Date:    11/16/2021  Time:    08:55        Review of Systems   Constitutional: Negative for chills, fatigue and fever.   HENT: Negative for ear pain and trouble swallowing.    Eyes: Negative for pain and visual disturbance.   Respiratory: Negative for cough and shortness of breath.    Cardiovascular: Negative for chest pain and leg swelling.   Gastrointestinal: Positive for abdominal pain and diarrhea. Negative for blood in stool, nausea and vomiting.   Endocrine: Negative for cold intolerance and heat intolerance.   Genitourinary: Negative for dysuria and frequency.   Musculoskeletal: Negative for joint swelling, myalgias and neck pain.   Skin: Negative for color change and rash.   Neurological: Negative for dizziness and headaches.   Psychiatric/Behavioral: Positive for dysphoric mood and sleep disturbance. Negative for behavioral problems. The patient is nervous/anxious.      Objective:  "    Vitals:    06/14/22 0756   BP: 124/82   Pulse: 76   Weight: 65.4 kg (144 lb 2.9 oz)   Height: 5' 4" (1.626 m)     Physical Exam  Vitals reviewed.   Constitutional:       Appearance: Normal appearance. She is well-developed and normal weight.   HENT:      Head: Normocephalic and atraumatic.      Right Ear: Tympanic membrane and external ear normal.      Left Ear: Tympanic membrane and external ear normal.      Nose: Nose normal.      Mouth/Throat:      Mouth: Mucous membranes are moist.      Pharynx: Oropharynx is clear.   Eyes:      Conjunctiva/sclera: Conjunctivae normal.      Pupils: Pupils are equal, round, and reactive to light.   Neck:      Thyroid: No thyromegaly.   Cardiovascular:      Rate and Rhythm: Normal rate and regular rhythm.      Heart sounds: No murmur heard.    No friction rub. No gallop.   Pulmonary:      Effort: Pulmonary effort is normal. No respiratory distress.      Breath sounds: No wheezing or rales.   Abdominal:      General: Bowel sounds are normal. There is no distension.      Palpations: Abdomen is soft.      Tenderness: There is no abdominal tenderness. There is no rebound.   Musculoskeletal:         General: Normal range of motion.      Cervical back: Normal range of motion and neck supple.   Lymphadenopathy:      Cervical: No cervical adenopathy.   Skin:     General: Skin is warm and dry.      Findings: No rash.   Neurological:      Mental Status: She is alert and oriented to person, place, and time.   Psychiatric:         Attention and Perception: Attention and perception normal.         Mood and Affect: Mood is anxious. Affect is tearful.         Speech: Speech normal.         Behavior: Behavior normal.         Thought Content: Thought content normal.         Cognition and Memory: Cognition and memory normal.         Judgment: Judgment normal.       Assessment:     1. Routine general medical examination at a health care facility    2. Abdominal spasms    3. AUDIE (generalized " anxiety disorder)      Plan:   Shane was seen today for annual exam.    Diagnoses and all orders for this visit:    Routine general medical examination at a health care facility - labs ordered. Discussed Health Maintenance issues.     -     TSH; Future  -     T4, Free; Future  -     Lipid Panel; Future  -     Hemoglobin A1C; Future  -     Comprehensive Metabolic Panel; Future  -     CBC Auto Differential; Future    Abdominal spasms- - New Problem, discusses symptoms, work up, suspected diagnosis. Will place order for  labs.  Continue with current medications and interventions until labs are reviewed to make adjustments if already on treatment . Trial of bentyl for abdominal spasms , can also use probiotic if needed for diarrhea.     -     TSH; Future  -     T4, Free; Future  -     Lipid Panel; Future  -     Hemoglobin A1C; Future  -     Comprehensive Metabolic Panel; Future  -     CBC Auto Differential; Future  -     dicyclomine (BENTYL) 20 mg tablet; Take 1 tablet (20 mg total) by mouth every 6 to 8 hours as needed (abdominal spasms).    AUDIE (generalized anxiety disorder) - not controlled didn't do well with buspar due to SE. Willing to try lexapro. Start 5mg at night x 3-5 days then increase to 10mg qhs. Discussed this may also help some with hot flashes and sleep as well as IBS and anxiety issues.  She will follow back up in 6 weeks with virtual visit.  -     TSH; Future  -     T4, Free; Future  -     Lipid Panel; Future  -     Hemoglobin A1C; Future  -     Comprehensive Metabolic Panel; Future  -     CBC Auto Differential; Future  -     EScitalopram oxalate (LEXAPRO) 10 MG tablet; Take 0.5-1 tablets (5-10 mg total) by mouth every evening.  -     ALPRAZolam (XANAX) 0.25 MG tablet; Take 1 tablet (0.25 mg total) by mouth 2 (two) times daily as needed for Anxiety.            Follow up in about 6 weeks (around 7/26/2022) for f/u Telemed Dr Zavala/ audie, ibs.    Patient Instructions   Florastor -generic version at  [facility] ([facility])  [facility]

## 2022-07-26 ENCOUNTER — OFFICE VISIT (OUTPATIENT)
Dept: PRIMARY CARE CLINIC | Facility: CLINIC | Age: 52
End: 2022-07-26
Payer: COMMERCIAL

## 2022-07-26 DIAGNOSIS — F41.1 GAD (GENERALIZED ANXIETY DISORDER): Primary | ICD-10-CM

## 2022-07-26 DIAGNOSIS — E55.9 VITAMIN D DEFICIENCY: ICD-10-CM

## 2022-07-26 DIAGNOSIS — R10.9 ABDOMINAL SPASMS: ICD-10-CM

## 2022-07-26 PROCEDURE — 99214 PR OFFICE/OUTPT VISIT, EST, LEVL IV, 30-39 MIN: ICD-10-PCS | Mod: 95,,, | Performed by: FAMILY MEDICINE

## 2022-07-26 PROCEDURE — 99214 OFFICE O/P EST MOD 30 MIN: CPT | Mod: 95,,, | Performed by: FAMILY MEDICINE

## 2022-07-26 NOTE — PROGRESS NOTES
Subjective:      Patient ID: Shane Mckeon is a 52 y.o. female.    Chief Complaint: Follow-up (6 week (uriah,ibs))    Disclaimer:  This note is prepared using voice recognition software and as such is likely to have errors and has not been proof read. Please contact me for questions.     The patient location is: home  The chief complaint leading to consultation is: mychart request     Visit type: video and audio simultaneous    Face to Face time with patient: 1136am -1142am      20  minutes of total time spent on the encounter, which includes face to face time and non-face to face time preparing to see the patient (eg, review of tests), Obtaining and/or reviewing separately obtained history, Documenting clinical information in the electronic or other health record, Independently interpreting results (not separately reported) and communicating results to the patient/family/caregiver, or Care coordination (not separately reported).     Each patient to whom he or she provides medical services by telemedicine is:  (1) informed of the relationship between the physician and patient and the respective role of any other health care provider with respect to management of the patient; and (2) notified that he or she may decline to receive medical services by telemedicine and may withdraw from such care at any time.    Shane Mckeon is a 52 y.o. female who presents to clinic for f/u. Helped some With her anxiety. At night taking 1/2 of the lexapro 10 mg tablets so only taking 5mg.   No vivid dreams.  No nausea no vomiting.  At times does feel a bit still fatigued.    Every AM takes the bentyl. Does help with the bowel spasms. Still gets upset stomach with anxious stomach.  She has not been taking it twice a day.  Does relate more this spasms in the diarrhea directly to stressful days.    She still sees Dr. Margot Fragoso for GYN on estrogen on 1mg tablets still rarely will use Xanax occasionally.     Lab Results   Component  Value Date    HGBA1C 4.8 06/14/2022      Lab Results   Component Value Date    CHOL 192 06/14/2022    CHOL 210 (H) 08/13/2020    CHOL 215 (H) 04/23/2019     Lab Results   Component Value Date    LDLCALC 102.2 06/14/2022    LDLCALC 104.6 08/13/2020    LDLCALC 143.4 04/23/2019       Wt Readings from Last 10 Encounters:   06/14/22 65.4 kg (144 lb 2.9 oz)   12/09/21 67 kg (147 lb 11.3 oz)   11/16/21 68.5 kg (151 lb 0.2 oz)   10/25/21 68.5 kg (151 lb)   09/17/21 69.2 kg (152 lb 8.9 oz)   05/15/21 70.3 kg (155 lb)   08/13/20 72.6 kg (159 lb 15.1 oz)   06/26/19 66.8 kg (147 lb 4.3 oz)   06/18/19 68.5 kg (151 lb 0.2 oz)   04/23/19 66.6 kg (146 lb 13.2 oz)       The 10-year ASCVD risk score (Chao ONEAL Jr., et al., 2013) is: 1%    Values used to calculate the score:      Age: 52 years      Sex: Female      Is Non- : No      Diabetic: No      Tobacco smoker: No      Systolic Blood Pressure: 124 mmHg      Is BP treated: No      HDL Cholesterol: 60 mg/dL      Total Cholesterol: 192 mg/dL        Lab Results   Component Value Date    WBC 5.53 06/14/2022    HGB 12.5 06/14/2022    HCT 38.5 06/14/2022     06/14/2022    CHOL 192 06/14/2022    TRIG 149 06/14/2022    HDL 60 06/14/2022    ALT 19 06/14/2022    AST 24 06/14/2022     (L) 06/14/2022    K 4.8 06/14/2022     06/14/2022    CREATININE 0.7 06/14/2022    BUN 9 06/14/2022    CO2 23 06/14/2022    TSH 2.565 06/14/2022    HGBA1C 4.8 06/14/2022       X-Ray Abdomen Flat And Erect  Narrative: EXAMINATION:  XR ABDOMEN FLAT AND ERECT    CLINICAL HISTORY:  Abdominal distension (gaseous)    TECHNIQUE:  Flat and erect AP views of the abdomen were performed.    COMPARISON:  June 2019    FINDINGS:  Clear lung bases.  Intact bones.  No pathologic calcifications.  No evidence of bowel obstruction.  Tiny pelvic phleboliths.  Impression: As above    Electronically signed by: Cliff Lobato MD  Date:    11/16/2021  Time:    08:55        Review of Systems    Constitutional: Negative for activity change and unexpected weight change.   HENT: Negative for hearing loss and trouble swallowing.    Eyes: Negative for discharge and visual disturbance.   Respiratory: Negative for chest tightness and wheezing.    Cardiovascular: Negative for chest pain and palpitations.   Gastrointestinal: Negative for constipation, diarrhea and vomiting.   Endocrine: Negative for polydipsia and polyuria.   Genitourinary: Negative for difficulty urinating and hematuria.   Musculoskeletal: Negative for neck pain.   Neurological: Negative for headaches.   Psychiatric/Behavioral: Negative for dysphoric mood.     Objective:   There were no vitals filed for this visit.  Physical Exam  Vitals reviewed.   Constitutional:       General: She is awake. She is not in acute distress.     Appearance: Normal appearance. She is well-developed, well-groomed and normal weight. She is not ill-appearing.   HENT:      Head: Normocephalic and atraumatic.      Right Ear: External ear normal.      Left Ear: External ear normal.      Nose: Nose normal.      Mouth/Throat:      Lips: Pink.   Eyes:      Conjunctiva/sclera: Conjunctivae normal.   Pulmonary:      Effort: Pulmonary effort is normal.   Neurological:      Mental Status: She is alert.   Psychiatric:         Attention and Perception: Attention and perception normal. She is attentive.         Mood and Affect: Mood and affect normal. Mood is not anxious or depressed. Affect is not labile, blunt, angry or inappropriate.         Speech: Speech normal. She is communicative. Speech is not rapid and pressured, delayed, slurred or tangential.         Behavior: Behavior normal. Behavior is not agitated, slowed, aggressive, withdrawn, hyperactive or combative. Behavior is cooperative.         Thought Content: Thought content normal. Thought content is not paranoid or delusional. Thought content does not include homicidal or suicidal ideation. Thought content does not  include homicidal or suicidal plan.         Cognition and Memory: Cognition and memory normal. Memory is not impaired. She does not exhibit impaired recent memory or impaired remote memory.         Judgment: Judgment normal. Judgment is not impulsive or inappropriate.       Assessment:     1. AUDIE (generalized anxiety disorder)    2. Abdominal spasms    3. Vitamin D deficiency      Plan:   Shane was seen today for follow-up.    Diagnoses and all orders for this visit:    AUDIE (generalized anxiety disorder)- increase Lexapro to 10 mg at night try this for 2 weeks should help with overall anxiety and IBS symptoms    Abdominal spasms-continue with Bentyl finding some improvement can use twice a day up to 4 times daily as needed for abdominal spasms    Vitamin D deficiency-restart vitamin D supplement          Health Maintenance Due   Topic Date Due    Hepatitis C Screening  Never done    COVID-19 Vaccine (1) Never done    HIV Screening  Never done    Shingles Vaccine (1 of 2) Never done       Follow up in about 3 months (around 10/26/2022) for f/u Virtual Visit, f/u Nika Crouch, PA/ f/u lexapro and bentyl .    There are no Patient Instructions on file for this visit.

## 2022-10-27 ENCOUNTER — OFFICE VISIT (OUTPATIENT)
Dept: PRIMARY CARE CLINIC | Facility: CLINIC | Age: 52
End: 2022-10-27
Payer: COMMERCIAL

## 2022-10-27 VITALS — BODY MASS INDEX: 24.59 KG/M2 | HEIGHT: 64 IN | WEIGHT: 144 LBS

## 2022-10-27 DIAGNOSIS — F41.1 GAD (GENERALIZED ANXIETY DISORDER): Primary | ICD-10-CM

## 2022-10-27 DIAGNOSIS — R51.9 GENERALIZED HEADACHES: ICD-10-CM

## 2022-10-27 DIAGNOSIS — R10.9 ABDOMINAL SPASMS: ICD-10-CM

## 2022-10-27 PROCEDURE — 99214 OFFICE O/P EST MOD 30 MIN: CPT | Mod: 95,,, | Performed by: PHYSICIAN ASSISTANT

## 2022-10-27 PROCEDURE — 99214 PR OFFICE/OUTPT VISIT, EST, LEVL IV, 30-39 MIN: ICD-10-PCS | Mod: 95,,, | Performed by: PHYSICIAN ASSISTANT

## 2022-10-27 RX ORDER — ALPRAZOLAM 0.25 MG/1
0.25 TABLET ORAL 2 TIMES DAILY PRN
Qty: 30 TABLET | Refills: 0 | Status: SHIPPED | OUTPATIENT
Start: 2022-10-27
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
## 2022-11-18 ENCOUNTER — OFFICE VISIT (OUTPATIENT)
Dept: PRIMARY CARE CLINIC | Facility: CLINIC | Age: 52
End: 2022-11-18
Payer: COMMERCIAL

## 2022-11-18 VITALS — TEMPERATURE: 98 F | WEIGHT: 142.31 LBS | BODY MASS INDEX: 24.3 KG/M2 | HEART RATE: 72 BPM | HEIGHT: 64 IN

## 2022-11-18 DIAGNOSIS — H92.01 RIGHT EAR PAIN: ICD-10-CM

## 2022-11-18 DIAGNOSIS — L72.0 EPIDERMAL CYST: Primary | ICD-10-CM

## 2022-11-18 PROCEDURE — 99999 PR PBB SHADOW E&M-EST. PATIENT-LVL III: ICD-10-PCS | Mod: PBBFAC,,, | Performed by: PHYSICIAN ASSISTANT

## 2022-11-18 PROCEDURE — 99213 PR OFFICE/OUTPT VISIT, EST, LEVL III, 20-29 MIN: ICD-10-PCS | Mod: S$GLB,,, | Performed by: PHYSICIAN ASSISTANT

## 2022-11-18 PROCEDURE — 99213 OFFICE O/P EST LOW 20 MIN: CPT | Mod: S$GLB,,, | Performed by: PHYSICIAN ASSISTANT

## 2022-11-18 PROCEDURE — 99999 PR PBB SHADOW E&M-EST. PATIENT-LVL III: CPT | Mod: PBBFAC,,, | Performed by: PHYSICIAN ASSISTANT

## 2022-11-18 RX ORDER — FLUTICASONE PROPIONATE 50 MCG
2 SPRAY, SUSPENSION (ML) NASAL DAILY
Qty: 16 G | Refills: 0 | Status: SHIPPED | OUTPATIENT
Start: 2022-11-18 | End: 2022-12-12

## 2022-11-18 NOTE — PROGRESS NOTES
"Subjective:      Patient ID: Shane Mckeon is a 52 y.o. female.    Chief Complaint: Mass (C/O lump on top of head x 2 yrs. C/O pain and getting bigger in size. Denies any injuries)    Shane Mckeon is a 52 y.o. female who presents to clinic for lump on top of head that first noticed a couple of years ago but has recently felt it grow in size. Recall dad getting similar bumps and sometimes having them removed. Hasn't tried anything for it.  No discharge drainage. No history of injury/trauma to head     Also with right ear pain since out in the cold wind last weekend.  Has been using flonase but is now out.  No fever/discharge.        Review of Systems   Constitutional:  Negative for diaphoresis, fatigue and fever.   HENT:  Positive for ear pain. Negative for ear discharge.    Respiratory:  Negative for cough, shortness of breath and wheezing.    Cardiovascular:  Negative for chest pain and palpitations.   Gastrointestinal:  Negative for abdominal pain, diarrhea, nausea and vomiting.   Skin:         Lump on scalp      Objective:   Pulse 72   Temp 98.2 °F (36.8 °C) (Oral)   Ht 5' 4" (1.626 m)   Wt 64.5 kg (142 lb 4.9 oz)   LMP  (LMP Unknown) Comment: paty haider 2015  BMI 24.43 kg/m²   Physical Exam  Vitals reviewed.   Constitutional:       General: She is not in acute distress.     Appearance: She is well-developed. She is not diaphoretic.   HENT:      Head: Normocephalic and atraumatic.        Comments: Nontender mobile 1cm round subcutaneous mass to parietal scalp with no induration, ttp, erythema or color changes      Right Ear: External ear normal.      Left Ear: External ear normal.      Nose: Nose normal.   Cardiovascular:      Rate and Rhythm: Normal rate.   Pulmonary:      Effort: Pulmonary effort is normal. No respiratory distress.   Skin:     General: Skin is warm and dry.      Capillary Refill: Capillary refill takes less than 2 seconds.   Neurological:      Mental Status: She is alert and " oriented to person, place, and time.      Motor: No abnormal muscle tone.   Psychiatric:         Behavior: Behavior normal.     Assessment:      1. Epidermal cyst    2. Right ear pain       Plan:   Epidermal cyst  Comments:  disc. referral to dermatology if becomes bothersome to patient     Right ear pain  Comments:  cont. nasal steroid and nasal saline washes, antibiotic if not improv by next week   Orders:  -     fluticasone propionate (FLONASE) 50 mcg/actuation nasal spray; 2 sprays (100 mcg total) by Each Nostril route once daily.  Dispense: 16 g; Refill: 0        Nika Crouch PA-C   Physician Assistant   [facility]

## 2023-04-24 ENCOUNTER — TELEPHONE (OUTPATIENT)
Dept: PRIMARY CARE CLINIC | Facility: CLINIC | Age: 53
End: 2023-04-24
Payer: COMMERCIAL

## 2023-04-26 ENCOUNTER — OFFICE VISIT (OUTPATIENT)
Dept: PRIMARY CARE CLINIC | Facility: CLINIC | Age: 53
End: 2023-04-26
Payer: COMMERCIAL

## 2023-04-26 ENCOUNTER — TELEPHONE (OUTPATIENT)
Dept: PULMONOLOGY | Facility: CLINIC | Age: 53
End: 2023-04-26
Payer: COMMERCIAL

## 2023-04-26 VITALS
SYSTOLIC BLOOD PRESSURE: 135 MMHG | BODY MASS INDEX: 25.6 KG/M2 | HEIGHT: 64 IN | HEART RATE: 72 BPM | DIASTOLIC BLOOD PRESSURE: 88 MMHG | WEIGHT: 149.94 LBS | TEMPERATURE: 99 F

## 2023-04-26 DIAGNOSIS — F41.1 ANXIETY STATE: ICD-10-CM

## 2023-04-26 DIAGNOSIS — R03.0 ELEVATED BLOOD PRESSURE READING IN OFFICE WITHOUT DIAGNOSIS OF HYPERTENSION: ICD-10-CM

## 2023-04-26 DIAGNOSIS — F41.1 GAD (GENERALIZED ANXIETY DISORDER): ICD-10-CM

## 2023-04-26 DIAGNOSIS — R06.83 HABITUAL SNORING: Primary | ICD-10-CM

## 2023-04-26 PROCEDURE — 99214 PR OFFICE/OUTPT VISIT, EST, LEVL IV, 30-39 MIN: ICD-10-PCS | Mod: S$GLB,,, | Performed by: NURSE PRACTITIONER

## 2023-04-26 PROCEDURE — 99999 PR PBB SHADOW E&M-EST. PATIENT-LVL III: CPT | Mod: PBBFAC,,, | Performed by: NURSE PRACTITIONER

## 2023-04-26 PROCEDURE — 99999 PR PBB SHADOW E&M-EST. PATIENT-LVL III: ICD-10-PCS | Mod: PBBFAC,,, | Performed by: NURSE PRACTITIONER

## 2023-04-26 PROCEDURE — 99214 OFFICE O/P EST MOD 30 MIN: CPT | Mod: S$GLB,,, | Performed by: NURSE PRACTITIONER

## 2023-04-26 NOTE — TELEPHONE ENCOUNTER
----- Message from Jose E Pinzon sent at 4/26/2023 11:09 AM CDT -----  Regarding: Review Chart, HSAT  Review Chart, HSAT

## 2023-04-26 NOTE — PROGRESS NOTES
snChief Complaint  No chief complaint on file.        HPI     HPI  Shane Mckeon is a 53 y.o. female with medical diagnoses as listed in the medical history and problem list that presents for Habitual Snoring. This patient is new to me.     1. Habitual Snoring: Here to address snoring. She has videos as well.  reports loud snoring.    STOP-BANG questionnaire to assess risk for obstructive sleep apnea (KIESHA)  Snoring: Do you snore loudly? ANSWER: YES  Tired: Do you often feel tired, fatigued, or sleepy during daytime? ANSWER: YES [place] Sleepiness Scale Score = 13 (MODERATE Excessive Daytime Sleepiness)  Observed: Has anyone observed you stop breathing during your sleep? ANSWER: YES  Pressure: Do you have or are you being treated for high blood pressure? ANSWER: NO  BMI: BMI more than 35 kg/m2? ANSWER: NO (BMI = Body mass index is 25.73 kg/m².)   Age: Age over 50 yr old? ANSWER: YES (Age = 53 y.o.)  Neck circumference: Neck circumference greater than 40 cm? ANSWER: NO   Gender: Gender male? ANSWER: NO (Gender = female)    STOP-BANG Score = 5 (HIGH risk of KIESHA)    ADDITIONAL RELEVANT HISTORY: She has noticed or has been told that she wakes up gasping or choking.  She has been told she stops breathing when she sleeps.  She reports non-restorative sleep.  She experiences sleepiness when driving.     INSTRUCTIONS PROVIDED: Do not drive or perform hazardous activities while drowsy.        History     PAST MEDICAL HISTORY:  Past Medical History:   Diagnosis Date    Anxiety     Anxiety disorder, unspecified     Chest pain syndrome 11/05/2014    Hyperlipemia     Hyperlipidemia     Ovarian cyst        PAST SURGICAL HISTORY:  Past Surgical History:   Procedure Laterality Date    COLONOSCOPY N/A 12/09/2021    Procedure: COLONOSCOPY;  Surgeon: Lizet Swanson MD;  Location: [facility];  Service: Endoscopy;  Laterality: N/A;    ENDOMETRIAL ABLATION      HYSTERECTOMY      LAVH/RSO    KNEE SURGERY      right     SALPINGECTOMY Right     TUBAL LIGATION         SOCIAL HISTORY:  Social History     Socioeconomic History    Marital status:      Spouse name: mariangel    Number of children: 2   Occupational History    Occupation:       Comment: rent max   Tobacco Use    Smoking status: Never    Smokeless tobacco: Never   Substance and Sexual Activity    Alcohol use: Yes     Alcohol/week: 10.0 standard drinks     Types: 10 Glasses of wine per week    Drug use: Never    Sexual activity: Yes     Partners: Male     Birth control/protection: None     Social Determinants of Health     Financial Resource Strain: Unknown    Difficulty of Paying Living Expenses: Patient refused   Food Insecurity: Unknown    Worried About Running Out of Food in the Last Year: Patient refused    Ran Out of Food in the Last Year: Patient refused   Transportation Needs: No Transportation Needs    Lack of Transportation (Medical): No    Lack of Transportation (Non-Medical): No   Physical Activity: Insufficiently Active    Days of Exercise per Week: 2 days    Minutes of Exercise per Session: 30 min   Stress: Unknown    Feeling of Stress : Patient refused   Social Connections: Unknown    Frequency of Communication with Friends and Family: More than three times a week    Frequency of Social Gatherings with Friends and Family: Twice a week    Active Member of Clubs or Organizations: No    Attends Club or Organization Meetings: Never    Marital Status:    Housing Stability: Low Risk     Unable to Pay for Housing in the Last Year: No    Number of Places Lived in the Last Year: 1    Unstable Housing in the Last Year: No       FAMILY HISTORY:  Family History   Problem Relation Age of Onset    Heart failure Mother     Hypertension Mother     Hyperlipidemia Mother     Diabetes Mother     Heart attack Mother 55        MI    Heart disease Mother     Heart failure Father     Hypertension Father     Hyperlipidemia Father     Heart attack Father 60        " MI    Cancer Father     Heart disease Father     Kidney disease Father     Breast cancer Maternal Aunt        ALLERGIES AND MEDICATIONS: updated and reviewed.  Review of patient's allergies indicates:  No Known Allergies  Current Outpatient Medications   Medication Sig Dispense Refill    ALPRAZolam (XANAX) 0.25 MG tablet Take 1 tablet (0.25 mg total) by mouth 2 (two) times daily as needed for Anxiety. 30 tablet 0    dicyclomine (BENTYL) 20 mg tablet TAKE 1 TABLET BY MOUTH EVERY 6 TO 8 HOURS AS NEEDED FOR ABDOMINAL SPASMS 180 tablet 0    EScitalopram oxalate (LEXAPRO) 10 MG tablet Take 0.5-1 tablets (5-10 mg total) by mouth every evening. 30 tablet 11    estradioL (ESTRACE) 1 MG tablet Take 1.5 tablets (1.5 mg total) by mouth once daily. 135 tablet 3    vitamin D 1000 units Tab Take 2,000 mg by mouth once daily.       No current facility-administered medications for this visit.           Exam     ROS  Review of Systems   Constitutional:  Negative for appetite change, chills, fatigue and fever.   HENT:  Negative for congestion, ear pain, postnasal drip, rhinorrhea, sinus pressure, sneezing and sore throat.    Respiratory:  Negative for shortness of breath.    Cardiovascular:  Negative for chest pain and palpitations.   Gastrointestinal:  Negative for abdominal pain, constipation, diarrhea, nausea and vomiting.   Genitourinary:  Negative for dysuria.   Musculoskeletal:  Negative for arthralgias.   Neurological:  Negative for headaches.   Psychiatric/Behavioral:  Positive for sleep disturbance.          Physical Exam  Vitals:    04/26/23 1010   BP: 135/88   Pulse: 72   Temp: 98.6 °F (37 °C)   Weight: 68 kg (149 lb 14.6 oz)   Height: 5' 4" (1.626 m)    Body mass index is 25.73 kg/m².  Weight: 68 kg (149 lb 14.6 oz)   Height: 5' 4" (162.6 cm)   Physical Exam  Constitutional:       General: She is not in acute distress.     Appearance: Normal appearance.   HENT:      Head: Normocephalic and atraumatic.      Right Ear: " External ear normal.      Left Ear: External ear normal.      Nose: Nose normal.   Eyes:      Pupils: Pupils are equal, round, and reactive to light.   Cardiovascular:      Rate and Rhythm: Normal rate and regular rhythm.      Pulses: Normal pulses.   Pulmonary:      Effort: Pulmonary effort is normal. No respiratory distress.      Breath sounds: Normal breath sounds. No wheezing.   Abdominal:      General: Bowel sounds are normal.      Palpations: Abdomen is soft.   Musculoskeletal:      Cervical back: Neck supple.   Lymphadenopathy:      Cervical: No cervical adenopathy.   Skin:     General: Skin is warm and dry.      Capillary Refill: Capillary refill takes less than 2 seconds.   Neurological:      Mental Status: She is alert and oriented to person, place, and time.   Psychiatric:         Mood and Affect: Mood normal.           Health Maintenance         Date Due Completion Date    Hepatitis C Screening Never done ---    COVID-19 Vaccine (1) Never done ---    HIV Screening Never done ---    Shingles Vaccine (1 of 2) Never done ---    Influenza Vaccine (1) Never done ---    Lipid Panel 06/14/2023 6/14/2022    Mammogram 10/31/2023 10/31/2022    Override on 8/1/2017: Done    Override on 8/1/2016: Done    TETANUS VACCINE 11/01/2026 11/1/2016    Colorectal Cancer Screening 12/09/2026 12/9/2021              Assessment & Plan     Assessment & Plan  Problem List Items Addressed This Visit          Psychiatric    Anxiety state, unspecified  -The current medical regimen is effective;  continue present plan and medications.     Overview     Dx updated per 2019 IMO Load           AUDIE (generalized anxiety disorder)  -The current medical regimen is effective;  continue present plan and medications.      Other Visit Diagnoses       Habitual snoring    -  Primary  - Sleep study scheduled to begin 05/14/2023    Relevant Orders    Home Sleep Study    Elevated blood pressure reading in office without diagnosis of hypertension       - Blood Pressure borderline  - May benefit from Hypertensive therapy if B/P elevated at 2 month follow-up for Annual Physical   -B/P may be elevated due to possible sleep apnea   - We discussed decreasing salt intake and salt substitutes              Health Maintenance reviewed: Deferred per patient    Follow-up: 2 months for Routine Physical exam    30+ minutes of total time spent on the encounter, which includes face to face time and non-face to face time preparing to see the patient (eg, review of tests), Obtaining and/or reviewing separately obtained history, documenting clinical information in the electronic or other health record, independently interpreting results (not separately reported) and communicating results to the patient/family/caregiver, or Care coordination (not separately reported).

## 2023-04-27 ENCOUNTER — TELEPHONE (OUTPATIENT)
Dept: PULMONOLOGY | Facility: CLINIC | Age: 53
End: 2023-04-27
Payer: COMMERCIAL

## 2023-05-05 DIAGNOSIS — R10.9 ABDOMINAL SPASMS: ICD-10-CM

## 2023-05-08 RX ORDER — DICYCLOMINE HYDROCHLORIDE 20 MG/1
TABLET ORAL
Qty: 180 TABLET | Refills: 0 | Status: SHIPPED | OUTPATIENT
Start: 2023-05-08 | End: 2023-07-18

## 2023-05-16 ENCOUNTER — HOSPITAL ENCOUNTER (OUTPATIENT)
Dept: SLEEP MEDICINE | Facility: HOSPITAL | Age: 53
Discharge: HOME | End: 2023-05-16
Attending: NURSE PRACTITIONER
Payer: COMMERCIAL

## 2023-05-16 DIAGNOSIS — G47.33 OSA (OBSTRUCTIVE SLEEP APNEA): Primary | ICD-10-CM

## 2023-05-16 DIAGNOSIS — R06.83 HABITUAL SNORING: ICD-10-CM

## 2023-05-16 PROCEDURE — 95800 SLP STDY UNATTENDED: CPT | Mod: 26,,, | Performed by: INTERNAL MEDICINE

## 2023-05-16 PROCEDURE — 95806 SLEEP STUDY UNATT&RESP EFFT: CPT | Performed by: INTERNAL MEDICINE

## 2023-05-16 PROCEDURE — 95800 PR SLEEP STUDY, UNATTENDED, RECORD HEART RATE/O2 SAT/RESP ANAL/SLEEP TIME: ICD-10-PCS | Mod: 26,,, | Performed by: INTERNAL MEDICINE

## 2023-05-16 NOTE — Clinical Note
PHYSICIAN INTERPRETATION AND COMMENTS: Findings are consistent with moderate, positional obstructive sleep apnea(KIESHA). CPAP indicated. Please refer to sleep clinic CLINICAL HISTORY: 53 year old female presented with: 14 inch neck, BMI of 25.4, an [place] sleepiness score of 10, no comorbidities and symptoms of nocturnal snoring and witnessed apneas. Based on the clinical history, the patient has a high pre-test probability of having Mild KIESHA.

## 2023-05-17 PROBLEM — R06.83 HABITUAL SNORING: Status: ACTIVE | Noted: 2023-05-17

## 2023-05-17 NOTE — PROCEDURES
PHYSICIAN INTERPRETATION AND COMMENTS: Findings are consistent with moderate, positional obstructive sleep  apnea(KIESHA). CPAP indicated. Please refer to sleep clinic  CLINICAL HISTORY: 53 year old female presented with: 14 inch neck, BMI of 25.4, an [place] sleepiness score of 10, no comorbidities  and symptoms of nocturnal snoring and witnessed apneas. Based on the clinical history, the patient has a  high pre-test probability of having Mild KIESHA.  SLEEP STUDY FINDINGS: Patient underwent a 1 night Home Sleep Test and by behavioral criteria, slept for approximately  6.56 hours, with a sleep latency of 18 minutes and a sleep efficiency of 98%. Moderate sleep disordered breathing (AHI=19)  is noted based on a 4% hypopnea desaturation criteria, predominantly in the supine position (40 events/hour). The patient  slept supine 41.6% of the night based on valid recording time of 6.63 hours and is 10 times as likely to have  apneas/hypopneas when supine. When considering more subtle measures of sleep disordered breathing, the overall  respiratory disturbance index is moderate(RDI=33) based on a 1% hypopnea desaturation criteria with confirmation by  surrogate arousal indicators. The apneas/hypopneas are accompanied by minimal oxygen desaturation (percent time  below 90% SpO2: 1%, Min SpO2: 81.8%). The average desaturation across all sleep disordered breathing events is 3.6%. The  mean pulse rate is 74 BPM, with very frequent pulse rate variability (80 events with >= 6 BPM increase/decrease per hour).  TREATMENT CONSIDERATIONS: Consider nasal continuous positive airway pressure (CPAP/AutoPAP) as the initial  treatment choice based on the AHI severity. A mandibular advancement splint (MAS) or referral to an ENT surgeon for  modification to the airway should be considered to reduce the potential risk of hypertension, cardiovascular disease,  stroke and diabetes if the CPAP trial is unsuccessful or the patient prefers an  "alternative therapy. Based on the KIESHA Supine  data in the study, a Mandibular Advancement Splint (MAS) will likely provide treatment benefit independent of KIESHA  severity. The patient should avoid sleeping supine; the non-supine AHI is 10 times less severe than the supine AHI.  DISEASE MANAGEMENT CONSIDERATIONS: None[place]  [id number] [facility] [place] [address]/Nika Zavala MD         The sleep study that you ordered is complete.  You have ordered sleep LAB services to perform the sleep study for Shane Mckeon .      Please find Sleep Study result in  the "Media tab" of Chart Review menu.        You can look  for the report in the  Media by the document type "Sleep Study Documents". Alphabetizing  "Document type" column helps to find the SLEEP STUDY report  Faster.       As the ordering provider, you are responsible for reviewing the results and implementing a treatment plan with your patient.    If you need a Sleep Medicine provider to explain the sleep study findings and arrange treatment for the patient, please refer patient for consultation to our Sleep Clinic via [facility] with Ambulatory Consult Sleep.     To do that please place an order for an  "Ambulatory Consult Sleep" -  order , it will go to our clinic work queue for our staff  to contact the patient for an appointment.      For any questions, please contact our sleep lab  staff at [phone] to talk to clinical staff          Armani Lo MD   "

## 2023-05-18 DIAGNOSIS — G47.30 SLEEP APNEA IN ADULT: Primary | ICD-10-CM

## 2023-05-22 ENCOUNTER — PATIENT MESSAGE (OUTPATIENT)
Dept: PULMONOLOGY | Facility: CLINIC | Age: 53
End: 2023-05-22
Payer: COMMERCIAL

## 2023-06-01 ENCOUNTER — OFFICE VISIT (OUTPATIENT)
Dept: PULMONOLOGY | Facility: CLINIC | Age: 53
End: 2023-06-01
Payer: COMMERCIAL

## 2023-06-01 VITALS
HEIGHT: 64 IN | HEART RATE: 75 BPM | DIASTOLIC BLOOD PRESSURE: 78 MMHG | BODY MASS INDEX: 25.6 KG/M2 | OXYGEN SATURATION: 99 % | SYSTOLIC BLOOD PRESSURE: 132 MMHG | WEIGHT: 149.94 LBS

## 2023-06-01 DIAGNOSIS — G47.33 OSA (OBSTRUCTIVE SLEEP APNEA): Primary | ICD-10-CM

## 2023-06-01 DIAGNOSIS — G47.30 SLEEP APNEA IN ADULT: ICD-10-CM

## 2023-06-01 PROCEDURE — 99999 PR PBB SHADOW E&M-EST. PATIENT-LVL IV: ICD-10-PCS | Mod: PBBFAC,,, | Performed by: NURSE PRACTITIONER

## 2023-06-01 PROCEDURE — 99999 PR PBB SHADOW E&M-EST. PATIENT-LVL IV: CPT | Mod: PBBFAC,,, | Performed by: NURSE PRACTITIONER

## 2023-06-01 PROCEDURE — 99202 OFFICE O/P NEW SF 15 MIN: CPT | Mod: S$GLB,,, | Performed by: NURSE PRACTITIONER

## 2023-06-01 PROCEDURE — 99202 PR OFFICE/OUTPT VISIT, NEW, LEVL II, 15-29 MIN: ICD-10-PCS | Mod: S$GLB,,, | Performed by: NURSE PRACTITIONER

## 2023-06-01 NOTE — PROGRESS NOTES
"Subjective:      Patient ID: Shane Mckeon is a 53 y.o. female.    Chief Complaint: No chief complaint on file.    HPI    Patient presents to the office today for evaluation of sleep apnea.  Patient with snoring and witnessed apneas. Patient not having problems falling asleep, but wakes up frequently throughout the night.  Patient does not wake up feeling refreshed in the morning.  Patient with daytime hypersomnolence.  [place] Sleepiness Scale score 11.  STOP BANG 5. Sleep study was performed.   Bedtime: 10PM  Wake time: 6AM    Patient Active Problem List   Diagnosis    Anxiety state, unspecified    Ovarian cyst    Chest pain syndrome    Family history of ischemic heart disease    Atypical chest pain    Hyperlipidemia LDL goal <100    Allergic rhinitis    Flatulence    AUDIE (generalized anxiety disorder)    Vitamin D deficiency    Abdominal spasms    Habitual snoring         /78   Pulse 75   Ht 5' 4" (1.626 m)   Wt 68 kg (149 lb 14.6 oz)   LMP  (LMP Unknown) Comment: paty haider 2015  SpO2 99%   BMI 25.73 kg/m²   Body mass index is 25.73 kg/m².    Review of Systems   Constitutional:  Positive for fatigue.   Respiratory:  Positive for snoring and somnolence.    Psychiatric/Behavioral:  Positive for sleep disturbance.    All other systems reviewed and are negative.      Objective:      Physical Exam  Constitutional:       Appearance: Normal appearance. She is well-developed.   HENT:      Head: Normocephalic and atraumatic.      Nose: Nose normal.      Mouth/Throat:      Pharynx: Oropharynx is clear.   Cardiovascular:      Rate and Rhythm: Normal rate and regular rhythm.      Heart sounds: No murmur heard.    No gallop.   Pulmonary:      Effort: Pulmonary effort is normal.      Breath sounds: Normal breath sounds.   Abdominal:      Palpations: Abdomen is soft.   Musculoskeletal:         General: Normal range of motion.      Cervical back: Normal range of motion and neck supple.   Skin:     General: Skin " is warm and dry.   Neurological:      Mental Status: She is alert and oriented to person, place, and time.   Psychiatric:         Mood and Affect: Mood normal.         Behavior: Behavior normal.     Personal Diagnostic Review  Procedure Notes    Author Status Last  Updated Created   Armani Lo MD Signed Armani Lo MD 5/17/2023  4:19 PM 5/17/2023  4:19 PM          Assoc. Orders Procedures   HOME SLEEP STUDIES HOME SLEEP STUDIES       Pre-Op Dx Post-Op Dx   Habitual snoring None         PHYSICIAN INTERPRETATION AND COMMENTS: Findings are consistent with moderate, positional obstructive sleep  apnea(KIESHA). CPAP indicated. Please refer to sleep clinic  CLINICAL HISTORY: 53 year old female presented with: 14 inch neck, BMI of 25.4, an [place] sleepiness score of 10, no comorbidities  and symptoms of nocturnal snoring and witnessed apneas. Based on the clinical history, the patient has a  high pre-test probability of having Mild KIESHA.  SLEEP STUDY FINDINGS: Patient underwent a 1 night Home Sleep Test and by behavioral criteria, slept for approximately  6.56 hours, with a sleep latency of 18 minutes and a sleep efficiency of 98%. Moderate sleep disordered breathing (AHI=19)  is noted based on a 4% hypopnea desaturation criteria, predominantly in the supine position (40 events/hour). The patient  slept supine 41.6% of the night based on valid recording time of 6.63 hours and is 10 times as likely to have  apneas/hypopneas when supine. When considering more subtle measures of sleep disordered breathing, the overall  respiratory disturbance index is moderate(RDI=33) based on a 1% hypopnea desaturation criteria with confirmation by  surrogate arousal indicators. The apneas/hypopneas are accompanied by minimal oxygen desaturation (percent time  below 90% SpO2: 1%, Min SpO2: 81.8%). The average desaturation across all sleep disordered breathing events is 3.6%. The  mean pulse rate is 74 BPM, with very  frequent pulse rate variability (80 events with >= 6 BPM increase/decrease per hour).  TREATMENT CONSIDERATIONS: Consider nasal continuous positive airway pressure (CPAP/AutoPAP) as the initial  treatment choice based on the AHI severity. A mandibular advancement splint (MAS) or referral to an ENT surgeon for  modification to the airway should be considered to reduce the potential risk of hypertension, cardiovascular disease,  stroke and diabetes if the CPAP trial is unsuccessful or the patient prefers an alternative therapy. Based on the KIESHA Supine  data in the study, a Mandibular Advancement Splint (MAS) will likely provide treatment benefit independent of KIESHA  severity. The patient should avoid sleeping supine; the non-supine AHI is 10 times less severe than the supine AHI.  DISEASE MANAGEMENT CONSIDERATIONS: None.             Assessment:     1. KIESHA (obstructive sleep apnea)    2. Sleep apnea in adult       Outpatient Encounter Medications as of 6/1/2023   Medication Sig Dispense Refill    ALPRAZolam (XANAX) 0.25 MG tablet Take 1 tablet (0.25 mg total) by mouth 2 (two) times daily as needed for Anxiety. 30 tablet 0    dicyclomine (BENTYL) 20 mg tablet TAKE 1 TABLET BY MOUTH EVERY 6 TO 8 HOURS AS NEEDED FOR ABDOMINAL SPASMS Strength: 20 mg 180 tablet 0    EScitalopram oxalate (LEXAPRO) 10 MG tablet Take 0.5-1 tablets (5-10 mg total) by mouth every evening. 30 tablet 11    estradioL (ESTRACE) 1 MG tablet Take 1.5 tablets (1.5 mg total) by mouth once daily. 135 tablet 3    vitamin D 1000 units Tab Take 2,000 mg by mouth once daily.       No facility-administered encounter medications on file as of 6/1/2023.     Orders Placed This Encounter   Procedures    CPAP FOR HOME USE     Order Specific Question:   Length of need (1-99 months):     Answer:   99     Order Specific Question:   Type ():     Answer:   Auto CPAP     Order Specific Question:   Auto CPAP pressure setting range (cmH20):     Answer:   5-15      Order Specific Question:   Fulfillment Priority:     Answer:   Level 3:  AHI 15 to <30 with CDL or severe daytime sleepiness     Order Specific Question:   Humidification ():     Answer:   Heated     Order Specific Question:   Choose ONE mask type and its corresponding cushions and/or pillows:     Answer:    Nasal Mask, 1 per 90 days:  Nasal Cushions, (6 per 90 days):  Nasal Pillows, (6 per 90 days)     Order Specific Question:   Choose EITHER Heated or Non-Heated Tubjing     Answer:    Non-Heated Tubing, 1 per 90 days     Order Specific Question:   All other supplies as needed as listed below:     Answer:    Headgear, 1 per 180 days     Order Specific Question:   All other supplies as needed as listed below:     Answer:    Disposable Filter, 6 per 90 days     Order Specific Question:   All other supplies as needed as listed below:     Answer:    Non-Disposable Filter, 1 per 180 days     Order Specific Question:   All other supplies as needed as listed below:     Answer:    Humidifier Chamber, 1 per 180 days     Order Specific Question:   All other supplies as needed as listed below:     Answer:    Chin Strap, 1 per 180 days     Plan:       AutoPAP and follow up in 10 weeks with download of data card and review of symptoms.    Problem List Items Addressed This Visit    None  Visit Diagnoses       KIESHA (obstructive sleep apnea)    -  Primary    Relevant Orders    CPAP FOR HOME USE    Sleep apnea in adult                 Thank you Andres Campos NP for this consultation.

## 2023-06-01 NOTE — PATIENT INSTRUCTIONS
[facility]   Toll free 24 hr line for assistance: [phone]   Option 1: CPAP  (press 1 - supplies ([employer]), press 2 questions regarding your machine)  Option 2: Oxygen, Nebulizer, Ventilator  Option 3: service  Option 4: Discharge (, providers, nurses)  Option 5: Diabetics  Option 6: Ortho (ortho braces, walker, wheelchairs, etc)  Option 7: Billing

## 2023-06-20 ENCOUNTER — PATIENT MESSAGE (OUTPATIENT)
Dept: PULMONOLOGY | Facility: CLINIC | Age: 53
End: 2023-06-20
Payer: COMMERCIAL

## 2023-07-18 DIAGNOSIS — R10.9 ABDOMINAL SPASMS: ICD-10-CM

## 2023-07-18 RX ORDER — DICYCLOMINE HYDROCHLORIDE 20 MG/1
TABLET ORAL
Qty: 180 TABLET | Refills: 0 | Status: SHIPPED | OUTPATIENT
Start: 2023-07-18 | End: 2023-08-08

## 2023-08-07 DIAGNOSIS — R10.9 ABDOMINAL SPASMS: ICD-10-CM

## 2023-08-08 RX ORDER — DICYCLOMINE HYDROCHLORIDE 20 MG/1
TABLET ORAL
Qty: 360 TABLET | Refills: 0 | Status: SHIPPED | OUTPATIENT
Start: 2023-08-08

## 2023-08-08 NOTE — TELEPHONE ENCOUNTER
A 90 day prescription has been sent. Please make sure to have patient make a followup appointment or keep the scheduled appointment to avoid future delays in medication refills. Thanks. Nika Zavala MD

## 2023-08-11 DIAGNOSIS — F41.1 GAD (GENERALIZED ANXIETY DISORDER): ICD-10-CM

## 2023-08-11 RX ORDER — ESCITALOPRAM OXALATE 10 MG/1
5-10 TABLET ORAL NIGHTLY
Qty: 90 TABLET | Refills: 0 | OUTPATIENT
Start: 2023-08-11

## 2023-08-11 NOTE — TELEPHONE ENCOUNTER
Refill Routing Note   Medication(s) are not appropriate for processing by [facility] for the following reason(s):      Clarification of medication (Rx) details    [facility] action(s):  Defer Care Due:  Appointment due     Medication Therapy Plan: Adherence is at 64%-pt picked up within 3 months on first fill then 5 months on next fill      Appointments  past 12m or future 3m with PCP    Date Provider   Last Visit   7/26/2022 Nika Zavala MD   Next Visit   Visit date not found Nika Zavala MD   ED visits in past 90 days: 0        Note composed:10:42 AM 08/11/2023

## 2023-08-11 NOTE — TELEPHONE ENCOUNTER
Care Due:                  Date            Visit Type   Department     Provider  --------------------------------------------------------------------------------                                ESTABLISHED                              PATIENT -    [facility]  Last Visit: 07-      [facility]           Nika Zavala  Next Visit: None Scheduled  None         None Found                                                            Last  Test          Frequency    Reason                     Performed    Due Date  --------------------------------------------------------------------------------    Office Visit  12 months..  EScitalopram.............  07- 07-    [facility] Embedded Care Due Messages. Reference number: [id number].   8/11/2023 12:14:42 AM CDT

## 2023-08-14 ENCOUNTER — PATIENT MESSAGE (OUTPATIENT)
Dept: PRIMARY CARE CLINIC | Facility: CLINIC | Age: 53
End: 2023-08-14
Payer: COMMERCIAL

## 2023-10-05 ENCOUNTER — OFFICE VISIT (OUTPATIENT)
Dept: PULMONOLOGY | Facility: CLINIC | Age: 53
End: 2023-10-05
Payer: COMMERCIAL

## 2023-10-05 VITALS
OXYGEN SATURATION: 99 % | HEART RATE: 85 BPM | DIASTOLIC BLOOD PRESSURE: 80 MMHG | WEIGHT: 155.19 LBS | HEIGHT: 64 IN | BODY MASS INDEX: 26.49 KG/M2 | SYSTOLIC BLOOD PRESSURE: 118 MMHG | RESPIRATION RATE: 19 BRPM

## 2023-10-05 DIAGNOSIS — G47.33 OSA ON CPAP: ICD-10-CM

## 2023-10-05 PROCEDURE — 99999 PR PBB SHADOW E&M-EST. PATIENT-LVL IV: CPT | Mod: PBBFAC,,, | Performed by: NURSE PRACTITIONER

## 2023-10-05 PROCEDURE — 99213 PR OFFICE/OUTPT VISIT, EST, LEVL III, 20-29 MIN: ICD-10-PCS | Mod: S$GLB,,, | Performed by: NURSE PRACTITIONER

## 2023-10-05 PROCEDURE — 99999 PR PBB SHADOW E&M-EST. PATIENT-LVL IV: ICD-10-PCS | Mod: PBBFAC,,, | Performed by: NURSE PRACTITIONER

## 2023-10-05 PROCEDURE — 99213 OFFICE O/P EST LOW 20 MIN: CPT | Mod: S$GLB,,, | Performed by: NURSE PRACTITIONER

## 2023-10-05 RX ORDER — BUSPIRONE HYDROCHLORIDE 7.5 MG/1
7.5 TABLET ORAL 2 TIMES DAILY PRN
COMMUNITY
Start: 2023-10-02

## 2023-10-05 NOTE — PROGRESS NOTES
"Subjective:      Patient ID: Shane Mckeon is a 53 y.o. female.    Chief Complaint: Sleep Apnea    HPI  Presents to office for review of AutoPAP therapy. Patient states improved symptoms with use of AutoPAP. Sleeping more soundly. Waking up feeling more refreshed. Improved daytime sleepiness. Patient states she is benefiting from use of the AutoPAP.   She is no longer napping after lunch.  She is waking up at 3AM and having hard time putting mask back on.    Patient Active Problem List   Diagnosis    Anxiety state, unspecified    Ovarian cyst    Chest pain syndrome    Family history of ischemic heart disease    Atypical chest pain    Hyperlipidemia LDL goal <100    Allergic rhinitis    Flatulence    AUDIE (generalized anxiety disorder)    Vitamin D deficiency    Abdominal spasms    KIESHA on CPAP     /80   Pulse 85   Resp 19   Ht 5' 4" (1.626 m)   Wt 70.4 kg (155 lb 3.3 oz)   LMP  (LMP Unknown) Comment: paty haider 2015  SpO2 99%   BMI 26.64 kg/m²   Body mass index is 26.64 kg/m².    Review of Systems   Constitutional: Negative.    HENT: Negative.     Respiratory: Negative.     Cardiovascular: Negative.    Musculoskeletal: Negative.    Gastrointestinal: Negative.    Neurological: Negative.    Psychiatric/Behavioral: Negative.       Objective:      Physical Exam  Constitutional:       Appearance: Normal appearance. She is well-developed.   HENT:      Head: Normocephalic and atraumatic.      Nose: Nose normal.   Cardiovascular:      Rate and Rhythm: Normal rate and regular rhythm.      Heart sounds: No murmur heard.     No gallop.   Pulmonary:      Effort: Pulmonary effort is normal.      Breath sounds: Normal breath sounds.   Abdominal:      Palpations: Abdomen is soft.      Tenderness: There is no abdominal tenderness.   Musculoskeletal:         General: Normal range of motion.      Cervical back: Normal range of motion and neck supple.   Skin:     General: Skin is warm and dry.   Neurological:      Mental " Status: She is alert and oriented to person, place, and time.   Psychiatric:         Mood and Affect: Mood normal.         Behavior: Behavior normal.       Personal Diagnostic Review    Initial compliance period 07/11/2023 - 08/09/2023  Compliance met Yes  Compliance percentage 80%  Payor Standard  Usage 07/11/2023 - 08/09/2023  Usage days 29/30 days (97%)  >= 4 hours 24 days (80%)  < 4 hours 5 days (17%)  Usage hours 193 hours 37 minutes  Average usage (total days) 6 hours 27 minutes  Average usage (days used) 6 hours 41 minutes  Median usage (days used) 7 hours 30 minutes  Total used hours (value since last reset - 08/09/2023) 192 hours  AirSense 10 AutoSet  Serial number [id number]  Mode AutoSet  Min Pressure 5 cmH2O  Max Pressure 15 cmH2O  EPR Fulltime  EPR level 3  Response Standard  Therapy  Pressure - cmH2O Median: 7.1 95th percentile: 9.4 Maximum: 10.7  Leaks - L/min Median: 0.5 95th percentile: 5.5 Maximum: 52.2  Events per hour AI: 3.0 HI: 0.1 AHI: 3.1  Apnea Index Central: 0.8 Obstructive: 1.9 Unknown: 0.2  RERA Index 0.0  Cheyne-Floyd respiration (average duration per night) 0 minutes (0%)  Usage - hours  Dictation #1  MRN:[id number]  CSN:[id number]     Assessment:       1. KIESHA on CPAP        Outpatient Encounter Medications as of 10/5/2023   Medication Sig Dispense Refill    ALPRAZolam (XANAX) 0.25 MG tablet Take 1 tablet (0.25 mg total) by mouth 2 (two) times daily as needed for Anxiety. 30 tablet 0    busPIRone (BUSPAR) 7.5 MG tablet Take 7.5 mg by mouth 2 (two) times daily as needed.      dicyclomine (BENTYL) 20 mg tablet TAKE 1 TABLET BY MOUTH EVERY 6-8 HOURS IF NEEDED FOR ABDOMINAL SPASMS 360 tablet 0    EScitalopram oxalate (LEXAPRO) 10 MG tablet Take 0.5-1 tablets (5-10 mg total) by mouth every evening. 30 tablet 11    estradioL (ESTRACE) 1 MG tablet Take 1.5 tablets (1.5 mg total) by mouth once daily. 135 tablet 3    vitamin D 1000 units Tab Take 2,000 mg by mouth once daily.       No  facility-administered encounter medications on file as of 10/5/2023.     No orders of the defined types were placed in this encounter.    Plan:     Problem List Items Addressed This Visit          Other    KIESHA on CPAP    Current Assessment & Plan     Compliant with PAP and benefits from use. Follow up annually in the sleep clinic.              Sleepiness improved with CPAP use.                Elizabeth LeJeune, ACNP, ANP